# Patient Record
Sex: FEMALE | Race: WHITE | HISPANIC OR LATINO | Employment: UNEMPLOYED | URBAN - METROPOLITAN AREA
[De-identification: names, ages, dates, MRNs, and addresses within clinical notes are randomized per-mention and may not be internally consistent; named-entity substitution may affect disease eponyms.]

---

## 2020-05-14 ENCOUNTER — INITIAL PRENATAL (OUTPATIENT)
Dept: OBGYN | Facility: CLINIC | Age: 25
End: 2020-05-14

## 2020-05-14 VITALS
HEIGHT: 62 IN | WEIGHT: 186.8 LBS | DIASTOLIC BLOOD PRESSURE: 68 MMHG | BODY MASS INDEX: 34.37 KG/M2 | SYSTOLIC BLOOD PRESSURE: 104 MMHG

## 2020-05-14 DIAGNOSIS — N93.8 DUB (DYSFUNCTIONAL UTERINE BLEEDING): ICD-10-CM

## 2020-05-14 DIAGNOSIS — G43.809 OTHER MIGRAINE WITHOUT STATUS MIGRAINOSUS, NOT INTRACTABLE: ICD-10-CM

## 2020-05-14 PROBLEM — G43.909 MIGRAINES: Status: ACTIVE | Noted: 2020-05-14

## 2020-05-14 LAB
INT CON NEG: NEGATIVE
INT CON POS: POSITIVE
POC URINE PREGNANCY TEST: POSITIVE

## 2020-05-14 PROCEDURE — 81025 URINE PREGNANCY TEST: CPT | Performed by: NURSE PRACTITIONER

## 2020-05-14 PROCEDURE — 99203 OFFICE O/P NEW LOW 30 MIN: CPT | Performed by: NURSE PRACTITIONER

## 2020-05-14 SDOH — HEALTH STABILITY: MENTAL HEALTH: HOW OFTEN DO YOU HAVE A DRINK CONTAINING ALCOHOL?: NEVER

## 2020-05-14 NOTE — PROGRESS NOTES
HPI:  Yamilex Ribera is a 25 y.o. y.o. female who presents for problem gyn visit: abnormal uterine bleeding secondary to a positive pregnancy . Pt reports migraines chronically that get bad in pregnancy. Denies any cramping, bleeding, leaking fluid. Patient's last menstrual period was 2020 (exact date).    NIRAV: 21. EGA 5 weeks 5 days. Reports no fetal movement. Reports no ED visits or US.     Review of Systems :  Constitutional: Denies any fevers, chills, weight loss.   EENT: Denies any hearing loss or visual changes.   Cardio: Denies any chest pain, pressure, palpitations, shortness of breath, swelling.   Resp: Denies any cough, trouble breathing, wheezing.   GI: Denies any abdominal pain, heartburn, bloody stools.   : Denies any urinary urgency, frequency, pain.   Pertinent positives documented in HPI and all other systems reviewed & are negative    All PMH, PSH, allergies, social history and FH reviewed and updated today:  Reports hx of migraines.   Reports c/s 2018 for fetal tachy at 8cm.   OB History    Para Term  AB Living   3 2 1     2   SAB TAB Ectopic Molar Multiple Live Births             2      # Outcome Date GA Lbr Jose/2nd Weight Sex Delivery Anes PTL Lv   3 Current            2 Para 18 39w0d  3.714 kg (8 lb 3 oz) M CS-Unspec   KECIA      Birth Comments: Pt states having issues with her placenta, baby was also having respiration problems   1 Term 11/09/15 39w0d  3.204 kg (7 lb 1 oz) F Vag-Spont EPI  KECIA     Denies any alcohol, drug and tobacco use. Report this is a unplanned pregnancy but desired. Reports one current male sexual partner who is also FOB.   Reports hx of chlamydia treated in . Denies any other hx of STIs. Reports last pap as not done.   Denies any genetic issues on patient's side of family and FOB side of family.   Reports taking prenatal vitamins, no other medications.  Denies any allergies to medication, food and environment.        Objective:  "  Vital measurements:  /68   Ht 1.575 m (5' 2\")   Wt 84.7 kg (186 lb 12.8 oz)   Body mass index is 34.17 kg/m². (Goal BM I>18 <25)    Physical Exam   Nursing note and vitals reviewed.    -Constitutional: She is oriented to person, place, and time. She appears well-developed and well-nourished. No distress.     -Skin: Skin is warm and dry. No rash noted. She is not diaphoretic. No erythema. No pallor.     -Psychiatric: She has a normal mood and affect. Her behavior is normal. Judgment and thought content normal.     -Heart auscultated and regular rate and rhythm.     -Lungs are clear to auscultation bilaterally.     -Thyroid is palpated as non tender without masses.     -Abdomin is soft on palpation. She exhibits no distension and no mass. No tenderness. She has no rebound and no guarding.     Genitourinary:    Pelvic exam will be deferred until new ob visit for insurance purposes.        Assessment:     1. DUB (dysfunctional uterine bleeding)  POCT Pregnancy         Plan:   Gyn exam deferred due to insurance   Partial physical exam performed  Pt to start/continue prenatal vitamins  Reviewed remedies for  migraines  New OB appt at 10-12 weeks     Return in about 5 weeks (around 6/18/2020).      "

## 2020-05-14 NOTE — PROGRESS NOTES
today  Pregnancy test in office today was positive   LMP 4/4/2020  PNV  Phone #[phone]  Pharmacy verified with patient  PT states having some cramping. She also states having migraines for the past 4 days

## 2020-05-21 ENCOUNTER — APPOINTMENT (OUTPATIENT)
Dept: OBGYN | Facility: CLINIC | Age: 25
End: 2020-05-21

## 2020-06-30 ENCOUNTER — INITIAL PRENATAL (OUTPATIENT)
Dept: OBGYN | Facility: CLINIC | Age: 25
End: 2020-06-30

## 2020-06-30 ENCOUNTER — HOSPITAL ENCOUNTER (OUTPATIENT)
Facility: MEDICAL CENTER | Age: 25
End: 2020-06-30
Attending: NURSE PRACTITIONER
Payer: MEDICAID

## 2020-06-30 DIAGNOSIS — O34.219 HX OF CESAREAN SECTION COMPLICATING PREGNANCY: ICD-10-CM

## 2020-06-30 DIAGNOSIS — O09.92 ENCOUNTER FOR SUPERVISION OF HIGH RISK PREGNANCY IN SECOND TRIMESTER, ANTEPARTUM: Primary | ICD-10-CM

## 2020-06-30 PROCEDURE — 0500F INITIAL PRENATAL CARE VISIT: CPT | Performed by: NURSE PRACTITIONER

## 2020-06-30 PROCEDURE — 87491 CHLMYD TRACH DNA AMP PROBE: CPT

## 2020-06-30 PROCEDURE — 87591 N.GONORRHOEAE DNA AMP PROB: CPT

## 2020-06-30 PROCEDURE — 87624 HPV HI-RISK TYP POOLED RSLT: CPT

## 2020-06-30 PROCEDURE — 88175 CYTOPATH C/V AUTO FLUID REDO: CPT

## 2020-06-30 PROCEDURE — 8198 PREG CTR PKG RATE (SYSTEM): Performed by: NURSE PRACTITIONER

## 2020-06-30 ASSESSMENT — ENCOUNTER SYMPTOMS
RESPIRATORY NEGATIVE: 1
NEUROLOGICAL NEGATIVE: 1
GASTROINTESTINAL NEGATIVE: 1
CARDIOVASCULAR NEGATIVE: 1
PSYCHIATRIC NEGATIVE: 1
EYES NEGATIVE: 1
MUSCULOSKELETAL NEGATIVE: 1
CONSTITUTIONAL NEGATIVE: 1

## 2020-06-30 ASSESSMENT — EDINBURGH POSTNATAL DEPRESSION SCALE (EPDS)
I HAVE BEEN SO UNHAPPY THAT I HAVE BEEN CRYING: ONLY OCCASIONALLY
I HAVE LOOKED FORWARD WITH ENJOYMENT TO THINGS: RATHER LESS THAN I USED TO
I HAVE BEEN SO UNHAPPY THAT I HAVE HAD DIFFICULTY SLEEPING: NOT AT ALL
I HAVE BEEN ABLE TO LAUGH AND SEE THE FUNNY SIDE OF THINGS: AS MUCH AS I ALWAYS COULD
THINGS HAVE BEEN GETTING ON TOP OF ME: YES, SOMETIMES I HAVEN'T BEEN COPING AS WELL AS USUAL
THE THOUGHT OF HARMING MYSELF HAS OCCURRED TO ME: NEVER
I HAVE FELT SCARED OR PANICKY FOR NO GOOD REASON: NO, NOT MUCH
I HAVE FELT SAD OR MISERABLE: NOT VERY OFTEN
I HAVE BEEN ANXIOUS OR WORRIED FOR NO GOOD REASON: YES, VERY OFTEN
I HAVE BLAMED MYSELF UNNECESSARILY WHEN THINGS WENT WRONG: YES, SOME OF THE TIME
TOTAL SCORE: 11

## 2020-06-30 NOTE — PROGRESS NOTES
Subjective:      S:  Yamilex Ribera is a 25 y.o.  female  @ She is 12w3d with an NIRAV of Estimated Date of Delivery: 21 based off of LMP who presents for her new OB exam.      Her OB hx includes 1  and 1 C/S due to TROY.   History of HSV I or II in self or partner: no  History of STIs in self or partner: no  History of Thyroid problems: no    No ER visits or previous care in this pregnancy. She has no complaints.  Desires AFP when appropriate.  Declines CF.  Reports no FM, VB, LOF, or cramping.  Denies dysuria, vaginal DC.  Pt is  and lives with FOB and children. FOB is happy and supportive. 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  Pregnancy is unplanned.    O:  There were no vitals filed for this visit. See H&P Prenatal Physical.          Wet mount: not indicated        FHTs: 160        Fundal ht: 12     A:   1.  IUP @ 12w3d NIRAV: Estimated Date of Delivery: 21 per LMP         2.  S=D        3.    Patient Active Problem List    Diagnosis Date Noted   • Encounter for supervision of high risk pregnancy in second trimester, antepartum 2020   • Hx of  section x 1 undecided about repeat or TOLAC 2020   • Migraines 2020         P:  1.  GC/CT done. Pap done.         2.  Prenatal labs and UDS ordered - lab slip given        3.  Discussed diet and exercise during pregnancy. Encouraged good nutrition, and daily exercise including walking or swimming. Discussed expected weight gain during pregnancy.              4.  Discussed adequate hydration during pregnancy.        5.  NOB packet given        6.  Return to office in 4 wks with physician to discuss R C/S vs TOLAC        7.  Complete OB US in 7-8 wks        8.  Pregnancy guide provided        9.  Childbirth education discussed. Not a candidate for Centering Pregnancy         HPI    Review of Systems   Constitutional: Negative.    HENT: Negative.    Eyes: Negative.     Respiratory: Negative.    Cardiovascular: Negative.    Gastrointestinal: Negative.    Genitourinary: Negative.    Musculoskeletal: Negative.    Skin: Negative.    Neurological: Negative.    Endo/Heme/Allergies: Negative.    Psychiatric/Behavioral: Negative.           Objective:     LMP 2020 (Exact Date)      Physical Exam  Vitals signs and nursing note reviewed.   Constitutional:       Appearance: She is well-developed.   Neck:      Musculoskeletal: Normal range of motion and neck supple.   Cardiovascular:      Rate and Rhythm: Normal rate and regular rhythm.      Heart sounds: Normal heart sounds.   Pulmonary:      Effort: Pulmonary effort is normal.      Breath sounds: Normal breath sounds.   Abdominal:      Palpations: Abdomen is soft.   Genitourinary:     Vagina: Normal.      Comments: Uterus enlarged, c/w 12 wk ga  Musculoskeletal: Normal range of motion.   Skin:     General: Skin is warm and dry.   Neurological:      Mental Status: She is alert and oriented to person, place, and time.      Deep Tendon Reflexes: Reflexes are normal and symmetric.   Psychiatric:         Behavior: Behavior normal.         Thought Content: Thought content normal.         Judgment: Judgment normal.                 Assessment/Plan:       1. Encounter for supervision of high risk pregnancy in second trimester, antepartum    - HEP C VIRUS ANTIBODY; Future  - PREG CNTR PRENATAL PN; Future  - THINPREP RFLX HPV ASCUS W/CTNG; Future  - URINE DRUG SCREEN W/CONF (AR); Future  - US-OB 2ND 3RD TRI COMPLETE; Future  - TSH; Future  - FREE THYROXINE; Future    2. Hx of  section complicating pregnancy

## 2020-06-30 NOTE — PROGRESS NOTES
NOB today  LMP: 04/04/2020  Last pap:3/27/2017  Phone # [phone]  Pharmacy confirmed  On PNV  Cystic Fibrosis test offered.  C/o  Cramping

## 2020-07-01 DIAGNOSIS — O09.92 ENCOUNTER FOR SUPERVISION OF HIGH RISK PREGNANCY IN SECOND TRIMESTER, ANTEPARTUM: ICD-10-CM

## 2020-07-03 LAB
C TRACH DNA GENITAL QL NAA+PROBE: NEGATIVE
CYTOLOGY REG CYTOL: NORMAL
N GONORRHOEA DNA GENITAL QL NAA+PROBE: NEGATIVE
SPECIMEN SOURCE: NORMAL

## 2020-07-04 LAB
HPV HR 12 DNA CVX QL NAA+PROBE: NEGATIVE
HPV16 DNA SPEC QL NAA+PROBE: NEGATIVE
HPV18 DNA SPEC QL NAA+PROBE: NEGATIVE
SPECIMEN SOURCE: NORMAL

## 2020-07-06 PROBLEM — R87.610 ASCUS OF CERVIX WITH NEGATIVE HIGH RISK HPV: Status: ACTIVE | Noted: 2020-07-06

## 2020-08-10 ENCOUNTER — ROUTINE PRENATAL (OUTPATIENT)
Dept: OBGYN | Facility: CLINIC | Age: 25
End: 2020-08-10

## 2020-08-10 VITALS — WEIGHT: 184 LBS | SYSTOLIC BLOOD PRESSURE: 110 MMHG | DIASTOLIC BLOOD PRESSURE: 60 MMHG | BODY MASS INDEX: 33.65 KG/M2

## 2020-08-10 DIAGNOSIS — O34.219 HX OF CESAREAN SECTION COMPLICATING PREGNANCY: ICD-10-CM

## 2020-08-10 PROCEDURE — 90040 PR PRENATAL FOLLOW UP: CPT | Performed by: OBSTETRICS & GYNECOLOGY

## 2020-08-10 NOTE — PROGRESS NOTES
"Pt here today for OB follow up  Denies FM  WT: 184 lb  BP: 110/60  Preferred pharmacy verified with pt.  Pt states this morning she noticed she had \"green\" bowel movements and urinated green . States she has a \"pinch\" on her left upper belly.    Desires AFP  US on 08/24/2020  PNP, UDS labs not done yet. Pt states she will get them done this week.   Good # [phone]    "

## 2020-08-10 NOTE — PROGRESS NOTES
"S: Pt presents for routine OB follow up.  No contractions, vaginal bleeding, or leaking fluids. Good fetal movement.  Patient here for TOLAC discussion.   Patient states that when she woke up this morning her bowel movement was greenish.  It was a normal bowel movement without diarrhea however patient states she has been constipated recently.    O: /60   Wt 83.5 kg (184 lb)   LMP 2020 (Exact Date)   BMI 33.65 kg/m²   Vitals:    08/10/20 1054   BP: 110/60   Weight: 83.5 kg (184 lb)     Vitals, fundal height , fetal position, and FHR reviewed on flowsheet    Patient Active Problem List   Diagnosis   • Migraines   • Encounter for supervision of high risk pregnancy in second trimester, antepartum   • Hx of  section x 1 undecided about repeat or TOLAC   • ASCUS of cervix with negative high risk HPV       A/P:  25 y.o.  at 18w2d presents for routine obstetric follow-up.     #Prenatal care  - Continue prenatal vitamins.  GCCT neg, pap: ASCUS w negative HPV-> normal testing  TOLAC counseling performed at 18wks. [ ] undecided on TOLAC vs repeat with BTL. Needs another MD visit at 28 wks for final decision  -TOLAC Counseling Note  Summary of prior delivery history: vaginal delivery then primary  section      Prior  History:    Gestation Age: 39    Indication for : fetal distress.  Patient states that she was told her baby was tachycardic and that if she did not have a  her baby \" would not make it\"    Labor:  Yes, patient was having contractions and was sick centimeters dilated at the time    What hospital/state/country was  done: [facility]    Any post-operative counseling regarding mode of delivery for next pregnancy:      Operative Note reviewed:  Records request pending       Patient was counseled regarding the benefits and risks of trial of labor after a prior  versus a repeat . These general risks and benefits are included in the " institutional consent that the patient signed at the clinic. Basically the patient was told that a successful vaginal delivery is the safest mode for mother and baby. A repeat  section prior to labor or in early labor is almost as safe for the mother as a vaginal delivery and minimizes the risk of uterine rupture but does carry a slight increase risk of excessive bleeding, infection or injury to adjacent organs. The mode of delivery with the greatest risk of complications is a  in active labor. A successful vaginal delivery has less risk of major hemorrhage and infection and typically a shorter hospital stay, however there is the risk of uterine rupture. A repeat  also increases the risk of subsequent pregnancies being delivered by .       In particular the patient was told that with spontaneous labor her chances of a successful trial of labor was 72.9% according to the MFMU calculator online and her risk of uterine rupture was <1%. These percentages were influenced by her history of ethnicity, BMI, reason for CS, delivery history, and type of uterine scar.      She knows that she may change her mind at a later date. She has also been told that she may be advised by the medical staff later in pregnancy or during labor that she should consider a different mode of delivery given new developments or complications in her pregnancy.     Based on this counseling the patient indicates at this time that she is undecided.  She states she is thinking about getting a tubal ligation but as she is young, she does not know if this is an appropriate time to do it however she does have 2 other children at home.  She states that if she was to have a tubal ligation, she would want a repeat  section       Counseling time was 30 minutes to discuss TOLAC.    -> Patient needs another MD visit at 28 weeks to discuss her final decision on mode of delivery  - Follow-up: 2wks

## 2020-08-24 ENCOUNTER — APPOINTMENT (OUTPATIENT)
Dept: RADIOLOGY | Facility: IMAGING CENTER | Age: 25
End: 2020-08-24
Attending: NURSE PRACTITIONER

## 2020-08-24 DIAGNOSIS — O09.92 ENCOUNTER FOR SUPERVISION OF HIGH RISK PREGNANCY IN SECOND TRIMESTER, ANTEPARTUM: ICD-10-CM

## 2020-08-24 PROCEDURE — 76805 OB US >/= 14 WKS SNGL FETUS: CPT | Mod: TC | Performed by: NURSE PRACTITIONER

## 2020-09-16 ENCOUNTER — ROUTINE PRENATAL (OUTPATIENT)
Dept: OBGYN | Facility: CLINIC | Age: 25
End: 2020-09-16

## 2020-09-16 VITALS — BODY MASS INDEX: 33.84 KG/M2 | SYSTOLIC BLOOD PRESSURE: 122 MMHG | DIASTOLIC BLOOD PRESSURE: 68 MMHG | WEIGHT: 185 LBS

## 2020-09-16 DIAGNOSIS — O09.92 ENCOUNTER FOR SUPERVISION OF HIGH RISK PREGNANCY IN SECOND TRIMESTER, ANTEPARTUM: Primary | ICD-10-CM

## 2020-09-16 PROCEDURE — 90040 PR PRENATAL FOLLOW UP: CPT | Performed by: NURSE PRACTITIONER

## 2020-09-16 NOTE — PROGRESS NOTES
Pt here today for OB follow up  Reports +FM  WT: 127 lb  BP: 122/68  Preferred pharmacy verified with pt.  Pt states last night she was having strong upper stomach pain that lasted for 4 hours, states today she is till having the discomfort. States no other complaints.   Pt states she was not able to get the AFP done after her last appt but still interested to get it done. Okayed per provider to get it done ASAP. Pt informed is a time sensitive test. Lab slip reprinted for pt.  1 hr gtt, CBC, and T.Pallidum labs ordered for pt. Instructions given today.  Good # [phone]

## 2020-09-16 NOTE — PROGRESS NOTES
S:  Pt is  at 23w4d here for routine OB follow up.  Reports an occ cramp.  Also reports some epigastric pain.  Reports good FM.  Denies VB, LOF, RUCs, or vaginal DC.  Denies cough, SOB, sore throat or fever.  Denies exposure to anyone with COVID 19.  Pt reports she wants to attempt a TOLAC.     O:    Vitals:    20 1617   BP: 122/68   Weight: 83.9 kg (185 lb)           FHTs: 158        Fundal ht: 23 cm.        AFP: not done yet.    Complete OB US  2020 2:36 PM     HISTORY/REASON FOR EXAM:  Evaluate fetal anatomy     TECHNIQUE/EXAM DESCRIPTION: OB complete ultrasound.     COMPARISON:  None     FINDINGS:  Fetal Lie:  Transverse  LMP:  2020  Clinical NIRAV by LMP:  2021     Placenta (Location):  Posterior  Placenta Previa: No  Placental Grade: I     Amniotic Fluid Volume:  JACQUELYN = 17.68 cm     Fetal Heart Rate:  146 bpm     Cervical Length:  4.85 cm transabdominal     No maternal adnexal mass is identified.     Umbilical Artery S/D Ratio(s):  Not applicable     Fetal Anatomy  (Seen or Not Seen)  Lateral Ventricles     Seen  Cisterna Magna        Seen  Cerebellum              Seen  CSP             Seen  Orbits             Seen  Face/Lips                Seen  Cord Insertion         Seen  Placental CI         Seen  4 Chamber Heart     Seen  LVOT               Seen  RVOT              Seen  3 Vessel View     Seen  Stomach       Seen  Kidneys                   Seen  Urinary Bladder      Seen  Spine                       Seen  3 Vessel Cord          Seen  Both Upper Extremities    Seen  Both Lower Extremities    Seen  Diaphragm             Seen  Movement       Seen  Gender:  Likely female     Fetal Biometry  BPD    4.47 cm, 19 weeks, 4 days  HC    17.06 cm, 19 weeks, 5 days  AC    14.60 cm, 19 weeks, 6 days  Femur Length    3.23 cm, 20 weeks  Humerus Length    3.32 cm, 21 weeks, 2 days  Cerebellum Diameter   1.85 cm     EGA by this US:  19 weeks, 5 days  NIRAV by this US: 2021  NIRAV by [facility] US:  Not  applicable     Estimated Fetal Weight:  322 grams  EFW Percentile: 27%     Comments:     IMPRESSION:     1.  Single intrauterine pregnancy of an estimated gestational age of 19 weeks, 5 days with an estimated date of delivery of 2021.     2.  No anatomic abnormality identified    A:  IUP 23w4d  Patient Active Problem List    Diagnosis Date Noted   • ASCUS of cervix with negative high risk HPV 2020   • Encounter for supervision of high risk pregnancy in second trimester, antepartum 2020   • Hx of  section x 1 undecided about repeat or TOLAC 2020   • Migraines 2020        P:  1.  Reviewed ultrasound w pt.          2.  Questions answered.          3.  Encouraged adequate water intake        4.  F/u 4 wks.        5.  D/w pt helps for GERD.  Handout given.        6.  28wk labs ordered. Reprinted PNP labs.

## 2020-09-24 ENCOUNTER — TELEPHONE (OUTPATIENT)
Dept: OBGYN | Facility: CLINIC | Age: 25
End: 2020-09-24

## 2020-09-24 NOTE — TELEPHONE ENCOUNTER
Pt LM on VM stating she was told she may have Acid Reflux at her last appointment and was given OTC remedy list. Pt wants to know if she can use off brand Maalox from [facility]. Spoke with pt and informed her she can use it, it is safe in pregnancy. Pt also report some upper epigastric pain, adv pt to keep log and she can discuss further with provider. Pt understood and agreed to plan

## 2020-09-27 ENCOUNTER — HOSPITAL ENCOUNTER (OUTPATIENT)
Facility: MEDICAL CENTER | Age: 25
End: 2020-09-27
Attending: OBSTETRICS & GYNECOLOGY | Admitting: OBSTETRICS & GYNECOLOGY

## 2020-09-27 ENCOUNTER — APPOINTMENT (OUTPATIENT)
Dept: RADIOLOGY | Facility: MEDICAL CENTER | Age: 25
End: 2020-09-27
Attending: NURSE PRACTITIONER
Payer: MEDICAID

## 2020-09-27 VITALS
TEMPERATURE: 97.8 F | WEIGHT: 185 LBS | BODY MASS INDEX: 34.04 KG/M2 | HEART RATE: 104 BPM | RESPIRATION RATE: 18 BRPM | DIASTOLIC BLOOD PRESSURE: 70 MMHG | HEIGHT: 62 IN | OXYGEN SATURATION: 98 % | SYSTOLIC BLOOD PRESSURE: 109 MMHG

## 2020-09-27 PROBLEM — K30 ACID INDIGESTION: Status: ACTIVE | Noted: 2020-09-27

## 2020-09-27 LAB
ALBUMIN SERPL BCP-MCNC: 3.5 G/DL (ref 3.2–4.9)
ALBUMIN/GLOB SERPL: 1.1 G/DL
ALP SERPL-CCNC: 93 U/L (ref 30–99)
ALT SERPL-CCNC: 13 U/L (ref 2–50)
AMYLASE SERPL-CCNC: 55 U/L (ref 20–103)
ANION GAP SERPL CALC-SCNC: 14 MMOL/L (ref 7–16)
APPEARANCE UR: CLEAR
AST SERPL-CCNC: 26 U/L (ref 12–45)
BASOPHILS # BLD AUTO: 0.2 % (ref 0–1.8)
BASOPHILS # BLD: 0.03 K/UL (ref 0–0.12)
BILIRUB SERPL-MCNC: 0.2 MG/DL (ref 0.1–1.5)
BUN SERPL-MCNC: 9 MG/DL (ref 8–22)
CALCIUM SERPL-MCNC: 8.6 MG/DL (ref 8.5–10.5)
CHLORIDE SERPL-SCNC: 100 MMOL/L (ref 96–112)
CO2 SERPL-SCNC: 20 MMOL/L (ref 20–33)
COLOR UR AUTO: YELLOW
CREAT SERPL-MCNC: 0.55 MG/DL (ref 0.5–1.4)
EOSINOPHIL # BLD AUTO: 0.12 K/UL (ref 0–0.51)
EOSINOPHIL NFR BLD: 0.9 % (ref 0–6.9)
ERYTHROCYTE [DISTWIDTH] IN BLOOD BY AUTOMATED COUNT: 41.1 FL (ref 35.9–50)
GLOBULIN SER CALC-MCNC: 3.2 G/DL (ref 1.9–3.5)
GLUCOSE SERPL-MCNC: 89 MG/DL (ref 65–99)
GLUCOSE UR QL STRIP.AUTO: NEGATIVE MG/DL
HCT VFR BLD AUTO: 38 % (ref 37–47)
HGB BLD-MCNC: 12.7 G/DL (ref 12–16)
IMM GRANULOCYTES # BLD AUTO: 0.07 K/UL (ref 0–0.11)
IMM GRANULOCYTES NFR BLD AUTO: 0.5 % (ref 0–0.9)
KETONES UR QL STRIP.AUTO: NEGATIVE MG/DL
LEUKOCYTE ESTERASE UR QL STRIP.AUTO: NEGATIVE
LIPASE SERPL-CCNC: 31 U/L (ref 11–82)
LYMPHOCYTES # BLD AUTO: 1.79 K/UL (ref 1–4.8)
LYMPHOCYTES NFR BLD: 13.7 % (ref 22–41)
MCH RBC QN AUTO: 30.2 PG (ref 27–33)
MCHC RBC AUTO-ENTMCNC: 33.4 G/DL (ref 33.6–35)
MCV RBC AUTO: 90.5 FL (ref 81.4–97.8)
MONOCYTES # BLD AUTO: 0.48 K/UL (ref 0–0.85)
MONOCYTES NFR BLD AUTO: 3.7 % (ref 0–13.4)
NEUTROPHILS # BLD AUTO: 10.53 K/UL (ref 2–7.15)
NEUTROPHILS NFR BLD: 81 % (ref 44–72)
NITRITE UR QL STRIP.AUTO: NEGATIVE
NRBC # BLD AUTO: 0 K/UL
NRBC BLD-RTO: 0 /100 WBC
PH UR STRIP.AUTO: 6 [PH] (ref 5–8)
PLATELET # BLD AUTO: 209 K/UL (ref 164–446)
PMV BLD AUTO: 10.9 FL (ref 9–12.9)
POTASSIUM SERPL-SCNC: 3.7 MMOL/L (ref 3.6–5.5)
PROT SERPL-MCNC: 6.7 G/DL (ref 6–8.2)
PROT UR QL STRIP: NEGATIVE MG/DL
RBC # BLD AUTO: 4.2 M/UL (ref 4.2–5.4)
RBC UR QL AUTO: NEGATIVE
SODIUM SERPL-SCNC: 134 MMOL/L (ref 135–145)
SP GR UR STRIP.AUTO: 1.02 (ref 1–1.03)
WBC # BLD AUTO: 13 K/UL (ref 4.8–10.8)

## 2020-09-27 PROCEDURE — 85025 COMPLETE CBC W/AUTO DIFF WBC: CPT

## 2020-09-27 PROCEDURE — A9270 NON-COVERED ITEM OR SERVICE: HCPCS | Performed by: NURSE PRACTITIONER

## 2020-09-27 PROCEDURE — 76700 US EXAM ABDOM COMPLETE: CPT

## 2020-09-27 PROCEDURE — 700111 HCHG RX REV CODE 636 W/ 250 OVERRIDE (IP): Performed by: NURSE PRACTITIONER

## 2020-09-27 PROCEDURE — 82150 ASSAY OF AMYLASE: CPT

## 2020-09-27 PROCEDURE — 700102 HCHG RX REV CODE 250 W/ 637 OVERRIDE(OP): Performed by: NURSE PRACTITIONER

## 2020-09-27 PROCEDURE — 83690 ASSAY OF LIPASE: CPT

## 2020-09-27 PROCEDURE — 81002 URINALYSIS NONAUTO W/O SCOPE: CPT

## 2020-09-27 PROCEDURE — 99213 OFFICE O/P EST LOW 20 MIN: CPT | Performed by: NURSE PRACTITIONER

## 2020-09-27 PROCEDURE — 96372 THER/PROPH/DIAG INJ SC/IM: CPT

## 2020-09-27 PROCEDURE — 36415 COLL VENOUS BLD VENIPUNCTURE: CPT

## 2020-09-27 PROCEDURE — 80053 COMPREHEN METABOLIC PANEL: CPT

## 2020-09-27 RX ORDER — MORPHINE SULFATE 4 MG/ML
5 INJECTION, SOLUTION INTRAMUSCULAR; INTRAVENOUS ONCE
Status: COMPLETED | OUTPATIENT
Start: 2020-09-27 | End: 2020-09-27

## 2020-09-27 RX ORDER — ONDANSETRON 2 MG/ML
4 INJECTION INTRAMUSCULAR; INTRAVENOUS ONCE
Status: DISCONTINUED | OUTPATIENT
Start: 2020-09-27 | End: 2020-09-27

## 2020-09-27 RX ORDER — FAMOTIDINE 20 MG/1
20 TABLET, FILM COATED ORAL 2 TIMES DAILY
Qty: 60 TAB | Refills: 1 | Status: SHIPPED | OUTPATIENT
Start: 2020-09-27 | End: 2020-10-26 | Stop reason: SDUPTHER

## 2020-09-27 RX ADMIN — LIDOCAINE HYDROCHLORIDE 15 ML: 20 SOLUTION OROPHARYNGEAL at 03:40

## 2020-09-27 RX ADMIN — MORPHINE SULFATE 5 MG: 4 INJECTION INTRAVENOUS at 03:35

## 2020-09-27 ASSESSMENT — PAIN SCALES - GENERAL: PAINLEVEL: 8

## 2020-09-27 NOTE — H&P
History and Physical      Yamilex Ribera is a 25 y.o. female  at 25w1d with an adela of 21who presents via EMS for upper right epigastric pain radiating to back. Onset x a few weeks. Sometimes relieved by vomiting. Sometimes relieved by tums otc. Sx are worse when lying down. Feels like she cannot eat after 1700 noc. States has some loose stool, greenish in color. Denies cramping, bleeding or leaking of fluid. Denies dysuria. No fever, chills, HA, blurred vision. Endorses good fetal movement.     Subjective:   negative  For CTXS.   positive Feels pain   negative for LOF  negative for vaginal bleeding.   positive for fetal movement    ROS: Pertinent items are noted in HPI.    Past Medical History:   Diagnosis Date   • Migraines 2020     Past Surgical History:   Procedure Laterality Date   • PRIMARY C SECTION       OB History    Para Term  AB Living   3 2 2     2   SAB TAB Ectopic Molar Multiple Live Births             2      # Outcome Date GA Lbr Jose/2nd Weight Sex Delivery Anes PTL Lv   3 Current            2 Term 18 39w0d  3.714 kg (8 lb 3 oz) M CS-Unspec   [facility]      Birth Comments: Pt states having issues with her placenta, baby was also having respiration problems   1 Term 11/09/15 39w0d  3.204 kg (7 lb 1 oz) F Vag-Spont EPI  K[facility]IA     Social History     Socioeconomic History   • Marital status:      Spouse name: Not on file   • Number of children: Not on file   • Years of education: Not on file   • Highest education level: Not on file   Occupational History   • Not on file   Social Needs   • Financial resource strain: Not on file   • Food insecurity     Worry: Not on file     Inability: Not on file   • Transportation needs     Medical: Not on file     Non-medical: Not on file   Tobacco Use   • Smoking status: Never Smoker   • Smokeless tobacco: Never Used   Substance and Sexual Activity   • Alcohol use: Never     Frequency: Never   • Drug use: Never   • Sexual  "activity: Yes     Partners: Male     Comment: none   Lifestyle   • Physical activity     Days per week: Not on file     Minutes per session: Not on file   • Stress: Not on file   Relationships   • Social connections     Talks on phone: Not on file     Gets together: Not on file     Attends Je[employer]'s witness service: Not on file     Active member of club or organization: Not on file     Attends meetings of clubs or organizations: Not on file     Relationship status: Not on file   • Intimate partner violence     Fear of current or ex partner: Not on file     Emotionally abused: Not on file     Physically abused: Not on file     Forced sexual activity: Not on file   Other Topics Concern   • Not on file   Social History Narrative   • Not on file     Allergies: Patient has no known allergies.    Current Facility-Administered Medications:   •  ondansetron (ZOFRAN) syringe/vial injection 4 mg, 4 mg, Intravenous, Once, GA GandhiP.    Prenatal care with [facility] starting at 5 5/7 week gestation with following problems:  Patient Active Problem List    Diagnosis Date Noted   • Acid indigestion 2020   • ASCUS of cervix with negative high risk HPV 2020   • Encounter for supervision of high risk pregnancy in second trimester, antepartum 2020   • Hx of  section x 1 undecided about repeat or TOLAC 2020   • Migraines 2020         Objective:      /70   Pulse (!) 104   Temp 36.6 °C (97.8 °F) (Temporal)   Resp 18   Ht 1.575 m (5' 2\")   Wt 83.9 kg (185 lb)   SpO2 98%     General:   no acute distress, alert, cooperative   Skin:   normal   HEENT:  PERRLA   Lungs:   CTA bilateral   Heart:   S1, S2 normal   Abdomen:   gravid, right upper quad tenderness to palpation.    EFW:  deferred   Pelvis:  deferred   FHT:  140 BPM   Uterine Size: deferred   Presentations: deferred   Cervix:     Dilation: No exam    Effacement:     Station:      Consistency:     Position:      Lab " Review  Lab:      Recent Results (from the past 5880 hour(s))   POCT Pregnancy    Collection Time: 05/14/20  2:53 PM   Result Value Ref Range    POC Urine Pregnancy Test positive Negative    Internal Control Positive Positive     Internal Control Negative Negative    THINPREP RFLX HPV ASCUS W/CTNG    Collection Time: 06/30/20  4:17 PM   Result Value Ref Range    Cytology Reg See Path Report     C. trachomatis by PCR Negative Negative    N. gonorrhoeae by PCR Negative Negative    Source Endo/Cervical    HPV by PCR Assoc. w/Thinprep    Collection Time: 06/30/20  4:17 PM   Result Value Ref Range    Source Endo/Cervical     HPV Genotype 16 Negative Negative    HPV Genotype 18 Negative Negative    HPV Other High Risk Genotypes Negative Negative   POC UA    Collection Time: 09/27/20  2:26 AM   Result Value Ref Range    POC Color Yellow     POC Appearance Clear     POC Glucose Negative Negative mg/dL    POC Ketones Negative Negative mg/dL    POC Specific Gravity 1.020 1.005 - 1.030    POC Blood Negative Negative    POC Urine PH 6.0 5.0 - 8.0    POC Protein Negative Negative mg/dL    POC Nitrites Negative Negative    POC Leukocyte Esterase Negative Negative   CBC WITH DIFFERENTIAL    Collection Time: 09/27/20  3:23 AM   Result Value Ref Range    WBC 13.0 (H) 4.8 - 10.8 K/uL    RBC 4.20 4.20 - 5.40 M/uL    Hemoglobin 12.7 12.0 - 16.0 g/dL    Hematocrit 38.0 37.0 - 47.0 %    MCV 90.5 81.4 - 97.8 fL    MCH 30.2 27.0 - 33.0 pg    MCHC 33.4 (L) 33.6 - 35.0 g/dL    RDW 41.1 35.9 - 50.0 fL    Platelet Count 209 164 - 446 K/uL    MPV 10.9 9.0 - 12.9 fL    Neutrophils-Polys 81.00 (H) 44.00 - 72.00 %    Lymphocytes 13.70 (L) 22.00 - 41.00 %    Monocytes 3.70 0.00 - 13.40 %    Eosinophils 0.90 0.00 - 6.90 %    Basophils 0.20 0.00 - 1.80 %    Immature Granulocytes 0.50 0.00 - 0.90 %    Nucleated RBC 0.00 /100 WBC    Neutrophils (Absolute) 10.53 (H) 2.00 - 7.15 K/uL    Lymphs (Absolute) 1.79 1.00 - 4.80 K/uL    Monos (Absolute) 0.48  0.00 - 0.85 K/uL    Eos (Absolute) 0.12 0.00 - 0.51 K/uL    Baso (Absolute) 0.03 0.00 - 0.12 K/uL    Immature Granulocytes (abs) 0.07 0.00 - 0.11 K/uL    NRBC (Absolute) 0.00 K/uL   Comp Metabolic Panel    Collection Time: 09/27/20  3:23 AM   Result Value Ref Range    Sodium 134 (L) 135 - 145 mmol/L    Potassium 3.7 3.6 - 5.5 mmol/L    Chloride 100 96 - 112 mmol/L    Co2 20 20 - 33 mmol/L    Anion Gap 14.0 7.0 - 16.0    Glucose 89 65 - 99 mg/dL    Bun 9 8 - 22 mg/dL    Creatinine 0.55 0.50 - 1.40 mg/dL    Calcium 8.6 8.5 - 10.5 mg/dL    AST(SGOT) 26 12 - 45 U/L    ALT(SGPT) 13 2 - 50 U/L    Alkaline Phosphatase 93 30 - 99 U/L    Total Bilirubin 0.2 0.1 - 1.5 mg/dL    Albumin 3.5 3.2 - 4.9 g/dL    Total Protein 6.7 6.0 - 8.2 g/dL    Globulin 3.2 1.9 - 3.5 g/dL    A-G Ratio 1.1 g/dL   AMYLASE    Collection Time: 09/27/20  3:23 AM   Result Value Ref Range    Amylase 55 20 - 103 U/L   LIPASE    Collection Time: 09/27/20  3:23 AM   Result Value Ref Range    Lipase 31 11 - 82 U/L   ESTIMATED GFR    Collection Time: 09/27/20  3:23 AM   Result Value Ref Range    GFR If African American >60 >60 mL/min/1.73 m 2    GFR If Non African American >60 >60 mL/min/1.73 m 2   US-ABDOMEN COMPLETE SURVEY  Order: [id number]  Status:  Final result   Visible to patient:  No (not released) Next appt:  10/14/2020 at 04:30 PM in OB/Gyn (Deepthi Gomez, C.N.M.)  Details    Reading Physician Reading Date Result Priority   Carlos Eduardo Burch M.D.  [phone] 9/27/2020 STAT      Narrative & Impression        9/27/2020 3:32 AM     HISTORY/REASON FOR EXAM:  Pain; r/o cholecystitis  Pain     TECHNIQUE/EXAM DESCRIPTION AND NUMBER OF VIEWS:  Complete abdomen survey.     COMPARISON: None     FINDINGS:  The liver measures 16.41 cm. The liver is heterogeneous with increased echogenicity. The echogenicity limits evaluation for liver mass. However, there is no evidence of solid mass lesion.     The gallbladder is normal without wall thickening or  calculi.  The gallbladder wall thickness measures 2.90 mm. There is no pericholecystic fluid.  The common duct measures 12.30 mm.     The visualized pancreas is unremarkable.  The visualized aorta is normal in caliber.     Intrahepatic IVC is patent.     The portal vein is patent with hepatopetal flow. The MPV measures 1.3 cm.     The right kidney measures 11.29 cm. The right kidney has normal cortical size and echotexture. The corticomedullary differentiation is preserved. There are no hydronephrosis, no renal calculi or masses.     The left kidney measures 11.33 cm. The left kidney has normal cortical size and echotexture. The corticomedullary differentiation is preserved. There are no hydronephrosis, no renal calculi or masses.     The spleen measures 10.70 cm maximally.     The bladder demonstrates no focal wall abnormality.     There is no ascites     IMPRESSION:        1. Gallbladder sludge. No gallbladder wall thickening or pericholecystic fluid.  2. Dilated CBD, measuring 12 mm. Distal obstruction is possible. Further evaluation with MRCP is recommended.  3. Hepatic steatosis.                   Assessment:   Yamilex Ribera at 25w1d  Labor status: Not evaluated.  Obstetrical history significant for   Patient Active Problem List    Diagnosis Date Noted   • Acid indigestion 2020   • ASCUS of cervix with negative high risk HPV 2020   • Encounter for supervision of high risk pregnancy in second trimester, antepartum 2020   • Hx of  section x 1 undecided about repeat or TOLAC 2020   • Migraines 2020   .      Plan:     CBC, CMP, amylase, lipase ordered and reviewed. Abdominal ultrasound ordered and reviewed. 5 IM morphine given. GI cocktail given. PO hydration. Patient felt better with GI cocktail. We were unable to obtain FFN because patient had recent intercourse. However, no UC's were noted and uterus was palpated soft.  Script for pepcid to take 2 x per day rather  than PRN for a week to hopefully ease sx. Discussed case with Dr. Carrizales. Based on ultrasound review patient may need MRCP. She really desires to return home at this time. If sx worsen she will present again for further eval. If patient presents again consider MRCP then as base workup is already completed.     I discussed the management with the CNM. I reviewed her note and agree with the documented findings and plan of care.    Additional attending comments:  We discussed admitting the patient for MRCP and GI or gen surg eval depending on the findings.  The patient was feeling significantly improved after the GI cocktail and therefore wanted to go home.  Explained the risks of biliary tree obstruction for patient and fetus and patient will return if symptoms return.     Serena Carrizales D.O.

## 2020-09-27 NOTE — PROGRESS NOTES
"0210- pt is a , 25.1 weeks gestation IUP, with c/o MUQ abdominal radiating pain and tenderness. Pt moaning and writhing in wheelchair with heat pack to upper abdomen. No c/o lof, bleeding, uc's or dfm. Pt states \"this has been hurting off and on since last week, I called the office and they said it could be acid reflux and I could take stuff at the store, but the only time it really feels better is after I throw up. But I  havent been able to throw up today. The pain got so bad I couldn't breathe so we called for an ambulance but by the time they got there they said I was fine and we could drive ourselves to [facility]\". efm and toco placed, vss.   0250- Kaylee LIN given report, received orders for labs and GI cocktail. Discussed poc with pt.  0300- Kaylee LIN and JEMAL Kirkland MS at bedside for assessment. Received orders for IM morphine, US, po hydration  0330- US tech at bedside. IM morphine and GI cocktail given (see mar)  0415- pt states she is feeling much better, pain is tolerable  0440- received orders may remove pt from monitors  0515- JEMAL Kirkland MS at bedside discussing poc for possible obs. Pt requesting to go home.  0530- received orders from Kaylee LIN for discharge with pt understanding to take Rx of pepcid accordingly, and return if pain returns. Discussed poc with pt.   0538- addressed discharge and Rx instructions with pt, all questions answered. Left off floor stable with SO at side.   "

## 2020-09-27 NOTE — DISCHARGE INSTRUCTIONS
General Instructions:  · If you think you are in labor, time contractions (lying on your left side) from the beginning of one contraction to the beginning of the next contraction for at least one hour.  · Increase fluid intake: you should consume 10-12 8 oz glasses of non-caffeinated fluid per day.  · Report any pressure or burning on urination to your physician.  · Monitor fetal movement: If you notice an absence or decrease in fetal movement, drink a large glass of water and rest on your side.  If there is no increase in movement, call your physician or go to the hospital for further evaluation.  · Report any sudden, sharp abdominal pain.  · Report any bleeding.  Spotting or pinkish discharge is normal after vaginal exam.  You may also spot after sexual intercourse.    Pre-term Labor (<37 weeks):  Call your physician or return to the hospital if:  · You have painless regular contractions more than 4 in one hour.  · Your water breaks (remember time and color).  · You have menstrual-like cramps, a low dull backache or pressure in your pelvis or back.  · Your baby does not move enough to complete the daily kick count (10 movements in 2 hours).  · Your baby moves much less often than on the days before or you have not felt your baby move all day.  · Please review the MEDICATION LIST section of your AFTER VISIT SUMMARY document.  · Take your medication as prescribed      Other Instructions:  Please carefully review your entire AFTER VISIT SUMMARY document for all discharge instructions.    Indigestion  Indigestion is a feeling of pain, discomfort, burning, or fullness in the upper part of your belly (abdomen). It can come and go. It may occur often or rarely. Indigestion tends to happen while you are eating or right after you have finished eating. Indigestion may be a symptom of another condition. It may be worse:  · At night.  · When bending over.  · While lying down.  Follow these instructions at home:  Eating and  drinking    · Follow an eating plan as told by your doctor.  · You may need to avoid foods and drinks such as:  ? Chocolate and cocoa.  ? Peppermint and mint flavorings.  ? Garlic and onions.  ? Horseradish.  ? Spicy and acidic foods, such as:  § Peppers.  § Chili powder and ramirez powder.  § Vinegar.  § Hot sauces and BBQ sauce.  ? Citrus fruits, such as:  § Oranges.  § Jeffery.  § Limes.  ? Tomato-based foods, such as:  § Red sauce and pizza with red sauce.  § Chili.   § Salsa.  ? Fried and fatty foods, such as:  § Donuts.  § French fries and potato chips.  § High-fat dressings.  ? High-fat meats, such as:  § Hot dogs and sausage.  § Rib eye steak.  § Ham and ohara.  ? High-fat dairy items, such as:  § Whole milk.  § Butter.  § Cream cheese.  ? Coffee and tea (with or without caffeine).  ? Drinks that contain alcohol.  ? Energy drinks and sports drinks.  ? Carbonated drinks or sodas.  ? Citrus fruit juices.  · Eat small meals often. Avoid eating large meals.  · Avoid drinking large amounts of liquid with your meals.  · Avoid eating meals during the 2-3 hours before bedtime.  · Avoid lying down right after you eat.  · Avoid exercise for 2 hours after you eat.  Lifestyle         · Maintain a healthy weight. Ask your doctor what weight is healthy for you. If you need to lose weight, work with your doctor.  · Exercise for at least 30 minutes on 5 or more days each week, or as told by your doctor.  ? Avoid exercises that include bending forward. This can make your symptoms worse.  · Wear loose clothes. Do not wear anything tight around your waist.  · Do not use any products that contain nicotine or tobacco, including cigarettes, e-cigarettes, and chewing tobacco. These can make your symptoms worse. If you need help quitting, ask your doctor.  · Raise (elevate) the head of your bed about 6 inches (15 cm) when you sleep.  · Try to lower your stress. If you need help doing this, ask your doctor.  General  instructions  · Take over-the-counter and prescription medicines only as told by your doctor.  ? Do not take aspirin, ibuprofen, or other NSAIDs unless your doctor says it is okay.  · Pay attention to any changes in your symptoms.  · Keep all follow-up visits as told by your doctor. This is important.  Contact a doctor if:  · You have new symptoms.  · You lose weight and you do not know why it is happening.  · You have trouble swallowing, or it hurts to swallow.  · Your symptoms do not get better with treatment.  · Your symptoms last for more than 2 days.  · You have a fever.  · You throw up (vomit).  Get help right away if:  · You have pain in your arms, neck, jaw, teeth, or back.  · You feel sweaty, dizzy, or light-headed.  · You pass out (faint).  · You have chest pain or shortness of breath.  · You cannot stop throwing up, or you throw up blood.  · Your poop (stool) is bloody or black.  · You have very bad pain in your belly.  These symptoms may represent a serious problem that is an emergency. Do not wait to see if the symptoms will go away. Get medical help right away. Call your local emergency services (911 in the [place] Do not drive yourself to the hospital.  Summary  · Indigestion is a feeling of pain, discomfort, burning, or fullness in the upper part of your belly. It tends to happen while you are eating or right after you have finished eating.  · Follow an eating plan and other lifestyle changes as told by your doctor.  · Take over-the-counter and prescription medicines only as told by your doctor. Do not take aspirin, ibuprofen, or other NSAIDs unless your doctor says it is okay.  · Contact your doctor if your symptoms do not get better or they get worse.  · Some symptoms may represent a serious problem that is an emergency. Do not wait to see if the symptoms will go away. Get medical help right away.  This information is not intended to replace advice given to you by your health care provider. Make  sure you discuss any questions you have with your health care provider.  Document Released: 01/20/2012 Document Revised: 05/20/2019 Document Reviewed: 05/20/2019  Elsevier Patient Education © 2020 [employer]

## 2020-09-30 ENCOUNTER — HOSPITAL ENCOUNTER (OUTPATIENT)
Dept: LAB | Facility: MEDICAL CENTER | Age: 25
End: 2020-09-30
Attending: OBSTETRICS & GYNECOLOGY
Payer: MEDICAID

## 2020-09-30 ENCOUNTER — ROUTINE PRENATAL (OUTPATIENT)
Dept: OBGYN | Facility: CLINIC | Age: 25
End: 2020-09-30

## 2020-09-30 VITALS — BODY MASS INDEX: 33.18 KG/M2 | SYSTOLIC BLOOD PRESSURE: 116 MMHG | WEIGHT: 181.4 LBS | DIASTOLIC BLOOD PRESSURE: 68 MMHG

## 2020-09-30 DIAGNOSIS — O09.92 ENCOUNTER FOR SUPERVISION OF HIGH RISK PREGNANCY IN SECOND TRIMESTER, ANTEPARTUM: Primary | ICD-10-CM

## 2020-09-30 PROCEDURE — 90040 PR PRENATAL FOLLOW UP: CPT | Performed by: NURSE PRACTITIONER

## 2020-09-30 ASSESSMENT — FIBROSIS 4 INDEX: FIB4 SCORE: 0.83

## 2020-09-30 NOTE — PROGRESS NOTES
Pt. Here for OB/FU. Reports Good FM.   Good # [phone]  Pt. States still having abdominal cramping.   Pt was seen at [facility] L&D on 9/27/2020 for abdominal pain.   Pharmacy verified.   Flu vaccine offered and declined.   Pt states tried to do PNP, 1 HR GTT but the lab made in error when she was   Registered but she will go sometime this week.

## 2020-09-30 NOTE — PATIENT INSTRUCTIONS
P:  1. Questions answered.           2. Encouraged adequate water intake        3.   labor precautions reviewed.        4.  F/u 2 wks.        5.  D/w pt her US results, may try a GB diet to see if that helps her symptoms.  Reviewed when to return to hospital.          6.  Encouraged pt to keep appt for labs.

## 2020-09-30 NOTE — PROGRESS NOTES
S:  Pt is  at 25w4d here for routine OB follow up.  Was seen on L&D for abdominal pain.  It has gotten better, though still there a little bit.  Has not yet done her labs.  Has questions about her US results.  Not sure if she will need her GB removed.  Reports good FM.  Denies VB, RUCs, LOF or vaginal DC.  Denies cough, SOB, sore throat or fever.  Denies exposure to anyone with COVID 19.    O:    Vitals:    20 1512   BP: 116/68   Weight: 82.3 kg (181 lb 6.4 oz)           FHTs: 150        Fundal ht: 25 cm.    A:  IUP at 25w4d  Patient Active Problem List    Diagnosis Date Noted   • Acid indigestion 2020   • ASCUS of cervix with negative high risk HPV 2020   • Encounter for supervision of high risk pregnancy in second trimester, antepartum 2020   • Hx of  section x 1 undecided about repeat or TOLAC 2020   • Migraines 2020       P:  1. Questions answered.           2. Encouraged adequate water intake        3.   labor precautions reviewed.        4.  F/u 2 wks.        5.  D/w pt her US results, may try a GB diet to see if that helps her symptoms.  Reviewed when to return to hospital.          6.  Encouraged pt to keep appt for labs.

## 2020-10-07 ENCOUNTER — HOSPITAL ENCOUNTER (OUTPATIENT)
Dept: LAB | Facility: MEDICAL CENTER | Age: 25
End: 2020-10-07
Attending: NURSE PRACTITIONER
Payer: COMMERCIAL

## 2020-10-07 DIAGNOSIS — O09.92 ENCOUNTER FOR SUPERVISION OF HIGH RISK PREGNANCY IN SECOND TRIMESTER, ANTEPARTUM: ICD-10-CM

## 2020-10-07 LAB
ABO GROUP BLD: NORMAL
BACTERIA #/AREA URNS HPF: ABNORMAL /HPF
BLD GP AB SCN SERPL QL: NORMAL
EPI CELLS #/AREA URNS HPF: ABNORMAL /HPF
GLUCOSE 1H P 50 G GLC PO SERPL-MCNC: 89 MG/DL (ref 70–139)
HYALINE CASTS #/AREA URNS LPF: ABNORMAL /LPF
RBC # URNS HPF: ABNORMAL /HPF
RH BLD: NORMAL
WBC #/AREA URNS HPF: ABNORMAL /HPF

## 2020-10-08 LAB
AMPHETAMINES UR QL: NEGATIVE NG/ML
APPEARANCE UR: ABNORMAL
BARBITURATES UR QL: NEGATIVE NG/ML
BASOPHILS # BLD AUTO: 0.3 % (ref 0–1.8)
BASOPHILS # BLD: 0.02 K/UL (ref 0–0.12)
BENZODIAZ UR QL: NEGATIVE NG/ML
BILIRUB UR QL STRIP.AUTO: NEGATIVE
CANNABINOIDS UR QL SCN: NEGATIVE NG/ML
COCAINE UR QL: NEGATIVE NG/ML
COLOR UR: YELLOW
DRUG SCREEN COMMENT UR-IMP: NORMAL
EOSINOPHIL # BLD AUTO: 0.18 K/UL (ref 0–0.51)
EOSINOPHIL NFR BLD: 2.4 % (ref 0–6.9)
ERYTHROCYTE [DISTWIDTH] IN BLOOD BY AUTOMATED COUNT: 45 FL (ref 35.9–50)
GLUCOSE UR STRIP.AUTO-MCNC: NEGATIVE MG/DL
HBV SURFACE AG SER QL: ABNORMAL
HCT VFR BLD AUTO: 43.5 % (ref 37–47)
HCV AB SER QL: NORMAL
HGB BLD-MCNC: 13.8 G/DL (ref 12–16)
HIV 1+2 AB+HIV1 P24 AG SERPL QL IA: NORMAL
IMM GRANULOCYTES # BLD AUTO: 0.02 K/UL (ref 0–0.11)
IMM GRANULOCYTES NFR BLD AUTO: 0.3 % (ref 0–0.9)
KETONES UR STRIP.AUTO-MCNC: ABNORMAL MG/DL
LEUKOCYTE ESTERASE UR QL STRIP.AUTO: ABNORMAL
LYMPHOCYTES # BLD AUTO: 1.73 K/UL (ref 1–4.8)
LYMPHOCYTES NFR BLD: 22.9 % (ref 22–41)
MCH RBC QN AUTO: 29.8 PG (ref 27–33)
MCHC RBC AUTO-ENTMCNC: 31.7 G/DL (ref 33.6–35)
MCV RBC AUTO: 94 FL (ref 81.4–97.8)
MDMA CTO UR SCN-MCNC: NEGATIVE NG/ML
METHADONE UR QL: NEGATIVE NG/ML
MICRO URNS: ABNORMAL
MONOCYTES # BLD AUTO: 0.28 K/UL (ref 0–0.85)
MONOCYTES NFR BLD AUTO: 3.7 % (ref 0–13.4)
NEUTROPHILS # BLD AUTO: 5.32 K/UL (ref 2–7.15)
NEUTROPHILS NFR BLD: 70.4 % (ref 44–72)
NITRITE UR QL STRIP.AUTO: NEGATIVE
NRBC # BLD AUTO: 0 K/UL
NRBC BLD-RTO: 0 /100 WBC
OPIATES UR QL: NEGATIVE NG/ML
OXYCODONE CTO UR SCN-MCNC: NEGATIVE NG/ML
PCP UR QL SCN: NEGATIVE NG/ML
PH UR STRIP.AUTO: 7 [PH] (ref 5–8)
PLATELET # BLD AUTO: 232 K/UL (ref 164–446)
PMV BLD AUTO: 11.8 FL (ref 9–12.9)
PROPOXYPH UR QL: NEGATIVE NG/ML
PROT UR QL STRIP: NEGATIVE MG/DL
RBC # BLD AUTO: 4.63 M/UL (ref 4.2–5.4)
RBC UR QL AUTO: NEGATIVE
RUBV AB SER QL: 125 IU/ML
SP GR UR STRIP.AUTO: 1.02
T4 FREE SERPL-MCNC: 1.17 NG/DL (ref 0.93–1.7)
TREPONEMA PALLIDUM IGG+IGM AB [PRESENCE] IN SERUM OR PLASMA BY IMMUNOASSAY: ABNORMAL
TSH SERPL DL<=0.005 MIU/L-ACNC: 1.47 UIU/ML (ref 0.38–5.33)
UROBILINOGEN UR STRIP.AUTO-MCNC: 0.2 MG/DL
WBC # BLD AUTO: 7.6 K/UL (ref 4.8–10.8)

## 2020-10-26 RX ORDER — FAMOTIDINE 20 MG/1
20 TABLET, FILM COATED ORAL 2 TIMES DAILY
Qty: 60 TAB | Refills: 1 | Status: SHIPPED | OUTPATIENT
Start: 2020-10-26 | End: 2020-11-25 | Stop reason: SDUPTHER

## 2020-11-03 ENCOUNTER — APPOINTMENT (OUTPATIENT)
Dept: RADIOLOGY | Facility: MEDICAL CENTER | Age: 25
End: 2020-11-03
Attending: STUDENT IN AN ORGANIZED HEALTH CARE EDUCATION/TRAINING PROGRAM
Payer: MEDICAID

## 2020-11-03 ENCOUNTER — HOSPITAL ENCOUNTER (EMERGENCY)
Facility: MEDICAL CENTER | Age: 25
End: 2020-11-03
Attending: OBSTETRICS & GYNECOLOGY | Admitting: OBSTETRICS & GYNECOLOGY
Payer: MEDICAID

## 2020-11-03 VITALS
WEIGHT: 180.78 LBS | TEMPERATURE: 98.8 F | SYSTOLIC BLOOD PRESSURE: 105 MMHG | DIASTOLIC BLOOD PRESSURE: 59 MMHG | OXYGEN SATURATION: 98 % | RESPIRATION RATE: 18 BRPM | HEART RATE: 77 BPM | BODY MASS INDEX: 33.06 KG/M2

## 2020-11-03 DIAGNOSIS — R10.11 COLICKY RUQ ABDOMINAL PAIN: ICD-10-CM

## 2020-11-03 LAB
ALBUMIN SERPL BCP-MCNC: 3 G/DL (ref 3.2–4.9)
ALBUMIN/GLOB SERPL: 1 G/DL
ALP SERPL-CCNC: 100 U/L (ref 30–99)
ALT SERPL-CCNC: 13 U/L (ref 2–50)
ANION GAP SERPL CALC-SCNC: 9 MMOL/L (ref 7–16)
AST SERPL-CCNC: 12 U/L (ref 12–45)
BASOPHILS # BLD AUTO: 0.2 % (ref 0–1.8)
BASOPHILS # BLD: 0.02 K/UL (ref 0–0.12)
BILIRUB SERPL-MCNC: 0.2 MG/DL (ref 0.1–1.5)
BUN SERPL-MCNC: 5 MG/DL (ref 8–22)
CALCIUM SERPL-MCNC: 8.2 MG/DL (ref 8.5–10.5)
CHLORIDE SERPL-SCNC: 104 MMOL/L (ref 96–112)
CO2 SERPL-SCNC: 21 MMOL/L (ref 20–33)
CREAT SERPL-MCNC: 0.48 MG/DL (ref 0.5–1.4)
EOSINOPHIL # BLD AUTO: 0.02 K/UL (ref 0–0.51)
EOSINOPHIL NFR BLD: 0.2 % (ref 0–6.9)
ERYTHROCYTE [DISTWIDTH] IN BLOOD BY AUTOMATED COUNT: 41.1 FL (ref 35.9–50)
GLOBULIN SER CALC-MCNC: 3.1 G/DL (ref 1.9–3.5)
GLUCOSE SERPL-MCNC: 75 MG/DL (ref 65–99)
HCT VFR BLD AUTO: 37.6 % (ref 37–47)
HGB BLD-MCNC: 12.1 G/DL (ref 12–16)
IMM GRANULOCYTES # BLD AUTO: 0.04 K/UL (ref 0–0.11)
IMM GRANULOCYTES NFR BLD AUTO: 0.3 % (ref 0–0.9)
LYMPHOCYTES # BLD AUTO: 1.27 K/UL (ref 1–4.8)
LYMPHOCYTES NFR BLD: 11 % (ref 22–41)
MCH RBC QN AUTO: 29.3 PG (ref 27–33)
MCHC RBC AUTO-ENTMCNC: 32.2 G/DL (ref 33.6–35)
MCV RBC AUTO: 91 FL (ref 81.4–97.8)
MONOCYTES # BLD AUTO: 0.23 K/UL (ref 0–0.85)
MONOCYTES NFR BLD AUTO: 2 % (ref 0–13.4)
NEUTROPHILS # BLD AUTO: 9.96 K/UL (ref 2–7.15)
NEUTROPHILS NFR BLD: 86.3 % (ref 44–72)
NRBC # BLD AUTO: 0 K/UL
NRBC BLD-RTO: 0 /100 WBC
PLATELET # BLD AUTO: 238 K/UL (ref 164–446)
PMV BLD AUTO: 11.4 FL (ref 9–12.9)
POTASSIUM SERPL-SCNC: 3.5 MMOL/L (ref 3.6–5.5)
PROT SERPL-MCNC: 6.1 G/DL (ref 6–8.2)
RBC # BLD AUTO: 4.13 M/UL (ref 4.2–5.4)
SODIUM SERPL-SCNC: 134 MMOL/L (ref 135–145)
WBC # BLD AUTO: 11.5 K/UL (ref 4.8–10.8)

## 2020-11-03 PROCEDURE — 80053 COMPREHEN METABOLIC PANEL: CPT

## 2020-11-03 PROCEDURE — 59025 FETAL NON-STRESS TEST: CPT

## 2020-11-03 PROCEDURE — 59025 FETAL NON-STRESS TEST: CPT | Mod: 26 | Performed by: OBSTETRICS & GYNECOLOGY

## 2020-11-03 PROCEDURE — 99284 EMERGENCY DEPT VISIT MOD MDM: CPT | Mod: GC,25 | Performed by: OBSTETRICS & GYNECOLOGY

## 2020-11-03 PROCEDURE — 99284 EMERGENCY DEPT VISIT MOD MDM: CPT

## 2020-11-03 PROCEDURE — 76705 ECHO EXAM OF ABDOMEN: CPT

## 2020-11-03 PROCEDURE — 36415 COLL VENOUS BLD VENIPUNCTURE: CPT

## 2020-11-03 PROCEDURE — 85025 COMPLETE CBC W/AUTO DIFF WBC: CPT

## 2020-11-03 RX ORDER — OXYCODONE HYDROCHLORIDE 5 MG/1
5 TABLET ORAL EVERY 8 HOURS PRN
Qty: 20 TAB | Refills: 0 | Status: SHIPPED | OUTPATIENT
Start: 2020-11-03 | End: 2020-11-30

## 2020-11-03 RX ORDER — ACETAMINOPHEN 500 MG
1000 TABLET ORAL EVERY 8 HOURS PRN
Qty: 30 TAB | Refills: 2 | Status: ON HOLD | OUTPATIENT
Start: 2020-11-03 | End: 2020-12-02

## 2020-11-03 ASSESSMENT — FIBROSIS 4 INDEX: FIB4 SCORE: 0.34

## 2020-11-03 ASSESSMENT — PAIN SCALES - GENERAL: PAINLEVEL: 4

## 2020-11-03 NOTE — PROGRESS NOTES
1040 Pt here via [facility]  With complaints of Right upper quad pain. Pt to triage #4 oriented to room and call system. MFTI done. External monitors placed. FHT  's 1303. Baby very active difficult to monitor. Pt denies uc's bleeding or leaking of fluid.   MD in to see pt- assessment done. US ordered and in room. Tech states there is still sludge. See report for detail. Pt will follow up wit [facility]. Reactive NST Pt discharged to home,

## 2020-11-03 NOTE — PROGRESS NOTES
LABOR AND DELIVERY TRIAGE NOTE    PATIENT ID:  NAME:  Yamilex Ribera  MRN:               [id number]  YOB: 1995    CC:  RUQ abdominal pain    HPI:  Yamilex Ribera is a 25 y.o. female  at 30w3d by LMP. Patient's last menstrual period was 2020 (exact date).. Estimated Date of Delivery: 21  Patient presents complaining of RUQ abdominal pain. Notes that it has been bothering her throughout most of the pregnancy. Was evaluated at L&D triage on  for the same complaint. Was started on Pepsid after that visit and pain got better, but has been bothering her for the past 3 weeks. Typically notices the pain after meals. Has some radiation to her back. Described as a sharp pain. Has associated nausea, vomiting diarrhea. No hematemesis. No melena. No hematochezia. Pepsid now only helping intermittently. Denies any alcohol or NSAID use.    Positive fetal movement. Negative contractions. Negative vaginal bleeding or loss of fluid.    ROS: Patient denies any fever chills, nausea, vomiting, headache, chest pain, shortness of breath, or dysuria or unusual swelling of hands or feet.     Prenatal Care: Obtained at [facility]    Prenatal Labs:   HepBsAg: NR HIV: NR Rubella: immune   RPR: NR PAP GBS: none   GC/CT: neg/neg O+/ Ab neg Quad Screen   No results for input(s): WBC, RBC, HEMOGLOBIN, HEMATOCRIT, MCV, MCH, RDW, PLATELETCT, MPV, NEUTSPOLYS, LYMPHOCYTES, MONOCYTES, EOSINOPHILS, BASOPHILS, RBCMORPHOLO in the last 72 hours.  No results for input(s): SODIUM, POTASSIUM, CHLORIDE, CO2, GLUCOSE, BUN, CPKTOTAL in the last 72 hours.       IMAGING:  RUQ US on  with gallbladder sludge. No gallbladder wall thickening or pericholecystic fluid. Dilated CBD (12.30 mm). Hepatic steatosis.     POB Hx:  OB History    Para Term  AB Living   3 2 2     2   SAB TAB Ectopic Molar Multiple Live Births             2      # Outcome Date GA Lbr Jose/2nd Weight Sex Delivery Anes PTL Lv   3 Current             2 Term 18 39w0d  3.714 kg (8 lb 3 oz) M CS-Unspec   KECIA      Birth Comments: Pt states having issues with her placenta, baby was also having respiration problems   1 Term 11/09/15 39w0d  3.204 kg (7 lb 1 oz) F Vag-Spont EPI  KECIA       PMH/Problem List:    Past Medical History:   Diagnosis Date   • Migraines 2020     Patient Active Problem List    Diagnosis Date Noted   • Acid indigestion 2020   • ASCUS of cervix with negative high risk HPV 2020   • Encounter for supervision of high risk pregnancy in second trimester, antepartum 2020   • Hx of  section x 1 undecided about repeat or TOLAC 2020   • Migraines 2020       Current Outpatient Medications:  No current facility-administered medications on file prior to encounter.      Current Outpatient Medications on File Prior to Encounter   Medication Sig Dispense Refill   • Prenatal MV-Min-Fe Fum-FA-DHA (PRENATAL 1 PO) Take  by mouth.         PSH:    Past Surgical History:   Procedure Laterality Date   • PRIMARY C SECTION         Allergies:   No Known Allergies    SH:  Social History     Socioeconomic History   • Marital status:      Spouse name: Not on file   • Number of children: Not on file   • Years of education: Not on file   • Highest education level: Not on file   Occupational History   • Not on file   Social Needs   • Financial resource strain: Not on file   • Food insecurity     Worry: Not on file     Inability: Not on file   • Transportation needs     Medical: Not on file     Non-medical: Not on file   Tobacco Use   • Smoking status: Never Smoker   • Smokeless tobacco: Never Used   Substance and Sexual Activity   • Alcohol use: Never     Frequency: Never   • Drug use: Never   • Sexual activity: Yes     Partners: Male     Comment: none   Lifestyle   • Physical activity     Days per week: Not on file     Minutes per session: Not on file   • Stress: Not on file   Relationships   • Social connections      Talks on phone: Not on file     Gets together: Not on file     Attends Voodoo service: Not on file     Active member of club or organization: Not on file     Attends meetings of clubs or organizations: Not on file     Relationship status: Not on file   • Intimate partner violence     Fear of current or ex partner: Not on file     Emotionally abused: Not on file     Physically abused: Not on file     Forced sexual activity: Not on file   Other Topics Concern   • Not on file   Social History Narrative   • Not on file         PHYSICAL EXAM:  Vitals:    20 1101 20 1105 20 1123   BP:  105/59 105/59   Pulse: 86 77 77   Resp:   18   Temp:   37.1 °C (98.8 °F)   TempSrc:   Temporal   SpO2: 98%  98%     Temp (24hrs), Av.1 °C (98.8 °F), Min:37.1 °C (98.8 °F), Max:37.1 °C (98.8 °F)    General: No acute distress, resting comfortably in bed.  HEENT: normocephalic, nontraumatic, PERRLA, EOMI  Cardiovascular: Heart RRR with no murmurs, rubs or gallops. Distal Pulses 2+  Respiratory: symmetric chest expansion, lungs CTA bilaterally with no wheezes rales or  rhonci  Abdomen: gravid, +May's sign, no rebound, no guarding, soft, no masses, no HSM  Musculoskeletal: strength 5/5 in four extremities  Neuro: non focal with no numbness, tingling or changes in sensation    SVE: deferred  [place]: no contractions noted; EFM: 130 baseline/ mod variability/ positive accels with no decels    A/P:  Yamilex Ribera is a 25 y.o. female  at 30w3d by LMP. Here with chief complaint of RUQ abdominal pain.   #RUQ Abdominal pain  - CBC, CMP, RUQ US  - Consider MRCP  - Spoke with GI, Dr. Villatoro, if MRCP attainable would be useful, if labs and ultrasound were reassuring that patient could likely be discharged home, appreciate recommendations  - Will likely require GI follow-up outpatient  # Precautions and FKC reviewed.  # To return with increased frequency/intensity UCs, LOF, VB, or decreased FM.  # F/u with [facility]  as scheduled.    Tim Dejesus D.O.

## 2020-11-13 ENCOUNTER — ROUTINE PRENATAL (OUTPATIENT)
Dept: OBGYN | Facility: CLINIC | Age: 25
End: 2020-11-13

## 2020-11-13 VITALS — WEIGHT: 183.4 LBS | BODY MASS INDEX: 33.54 KG/M2 | DIASTOLIC BLOOD PRESSURE: 60 MMHG | SYSTOLIC BLOOD PRESSURE: 110 MMHG

## 2020-11-13 DIAGNOSIS — Z98.891 HISTORY OF CESAREAN DELIVERY: ICD-10-CM

## 2020-11-13 DIAGNOSIS — O09.899 SUPERVISION OF OTHER HIGH RISK PREGNANCY, ANTEPARTUM: Primary | ICD-10-CM

## 2020-11-13 PROBLEM — O34.219 HX OF CESAREAN SECTION COMPLICATING PREGNANCY: Status: RESOLVED | Noted: 2020-06-30 | Resolved: 2020-11-13

## 2020-11-13 PROCEDURE — 90040 PR PRENATAL FOLLOW UP: CPT | Performed by: NURSE PRACTITIONER

## 2020-11-13 PROCEDURE — 90715 TDAP VACCINE 7 YRS/> IM: CPT | Performed by: NURSE PRACTITIONER

## 2020-11-13 PROCEDURE — 90471 IMMUNIZATION ADMIN: CPT | Performed by: NURSE PRACTITIONER

## 2020-11-13 ASSESSMENT — FIBROSIS 4 INDEX: FIB4 SCORE: 0.34

## 2020-11-13 NOTE — PROGRESS NOTES
OB follow up   + fetal movement. Active  No VB, LOF or UC's.  Flu vaccine offered Declined  Phone #  [phone]  Preferred pharmacy confirmed.  C/o  Cramping this morning   TDAP Today   Kick count sheet given today.  BTL Signed today     NDC: 70179-007-54  LOT#:   Expiration Date: 2022  Dose: 0.5  Site: Right Deltoid  Patient educated on use and side effects of medication. Name and  verified prior to injection. Pt tolerated? Well   Verified by Sue  Administered by Christine Palacios, Med Ass't at 4:13 PM.  Patient Provided Medication: no

## 2020-11-13 NOTE — LETTER
"Count Your Baby's Movements  Another step to a healthy delivery    Yamilex Cardenasgado Ribera             Dept: [phone]    How Many Weeks Pregnant? 31W6D    Date to Begin Countin2020              How to use this chart    One way for your physician to keep track of your baby's health is by knowing how often the baby moves (or \"kicks\") in your womb.  You can help your physician to do this by using this chart every day.    Every day, you should see how many hours it takes for your baby to move 10 times.  Start in the morning, as soon as you get up.    · First, write down the time your baby moves until you get to 10.  · Check off one box every time your baby moves until you get to 10.  · Write down the time you finished counting in the last column.  · Total how long it took to count up all 10 movements.  · Finally, fill in the box that shows how long this took.  After counting 10 movements, you no longer have to count any more that day.  The next morning, just start counting again as soon as you get up.    What should you call a \"movement\"?  It is hard to say, because it will feel different from one mother to another and from one pregnancy to the next.  The important thing is that you count the movements the same way throughout your pregnancy.  If you have more questions, you should ask your physician.    Count carefully every day!  SAMPLE:  Week 28    How many hours did it take to feel 10 movements?       Start  Time     1     2     3     4     5     6     7     8     9     10   Finish Time   Mon 8:20 ·  ·  ·  ·  ·  ·  ·  ·  ·  ·  11:40                  Sat               Sun                 IMPORTANT: You should contact your physician if it takes more than two hours for you to feel 10 movements.  Each morning, write down the time and start to count the movements of your baby.  Keep track by checking off one box every time you feel one movement.  When " "you have felt 10 \"kicks\", write down the time you finished counting in the last column.  Then fill in the   box (over the check petty) for the number of hours it took.  Be sure to read the complete instructions on the previous page.            "

## 2020-11-13 NOTE — PROGRESS NOTES
S) Pt is a 25 y.o.   at 31w6d  gestation. Routine prenatal care today. No complaints today. Labs up to date. Offered TDAP and flu, desires TDAP, but declines flu. Discussed delivery, desires to have repeat . She is unsure about tubal, but will sign papers today. Will suggest repeat MD appt to discuss  as this was suggested by the last MD that saw her.  labor precautions reviewed, all questions answered.   Fetal movement Normal  Cramping no  VB no  LOF no   Denies dysuria. Generally feels well today. Good self-care activities identified. Denies headaches, swelling, visual changes, or epigastric pain .     O) /60   Wt 83.2 kg (183 lb 6.4 oz)         Labs:       PNL: WNL       GCT: 82        AFP: Not Examined       GBS: N/A       Pertinent ultrasound -        20- Survey WNL, JACQUELYN 17.68cm, c/w prev dating.    A) IUP at 31w6d       S=D         Patient Active Problem List    Diagnosis Date Noted   • Acid indigestion 2020   • ASCUS of cervix with negative high risk HPV 2020   • Supervision of other high risk pregnancy, antepartum 2020   • Hx of  section x 1 undecided about repeat or TOLAC 2020   • Migraines 2020          SVE: deferred       Chaperone offered: n/a         TDAP: yes       FLU: no        BTL: yes       : no       C/S Consent: yes       IOL or C/S scheduled: no       LAST PAP: 20- ASCUS with HR HPV. Repeat pap in 3 years         P) s/s ptl vs general discomforts. Fetal movements reviewed. General ed and anticipatory guidance. Nutrition/exercise/vitamin. Plans breast Plans pp contraception- unsure  Continue PNV.

## 2020-11-16 ENCOUNTER — TELEPHONE (OUTPATIENT)
Dept: OBGYN | Facility: CLINIC | Age: 25
End: 2020-11-16

## 2020-11-16 NOTE — TELEPHONE ENCOUNTER
Pt called stating she has been seen in ER for RUQ pain, issues with gallbladder. Pt stated she was given narcotics and that has been helping. I asked pt if she has made an appt with GI (pt was referred by ER MD) pt stated she called and was informed they are not taking new patients at this time. I called [facility], spoke with Hazel and informed me they have referral but no notes that pt was informed they can not see her.  Hazel will call pt and schedule an appt, Pt informed, then stated she was told in the hospital that GI was not taking new patients at this time. Informed pt Hazel will be calling her to schedule appt. Pt understood and had no further questions or concerns

## 2020-11-25 RX ORDER — FAMOTIDINE 20 MG/1
20 TABLET, FILM COATED ORAL 2 TIMES DAILY
Qty: 60 TAB | Refills: 1 | Status: SHIPPED | OUTPATIENT
Start: 2020-11-25 | End: 2021-02-05

## 2020-11-29 ENCOUNTER — APPOINTMENT (OUTPATIENT)
Dept: RADIOLOGY | Facility: MEDICAL CENTER | Age: 25
DRG: 833 | End: 2020-11-29
Attending: OBSTETRICS & GYNECOLOGY
Payer: MEDICAID

## 2020-11-29 ENCOUNTER — HOSPITAL ENCOUNTER (INPATIENT)
Facility: MEDICAL CENTER | Age: 25
LOS: 2 days | DRG: 833 | End: 2020-12-02
Attending: OBSTETRICS & GYNECOLOGY | Admitting: OBSTETRICS & GYNECOLOGY
Payer: MEDICAID

## 2020-11-29 LAB
ALBUMIN SERPL BCP-MCNC: 3.6 G/DL (ref 3.2–4.9)
ALBUMIN/GLOB SERPL: 1.1 G/DL
ALP SERPL-CCNC: 183 U/L (ref 30–99)
ALT SERPL-CCNC: 26 U/L (ref 2–50)
AMYLASE SERPL-CCNC: 68 U/L (ref 20–103)
ANION GAP SERPL CALC-SCNC: 9 MMOL/L (ref 7–16)
AST SERPL-CCNC: 32 U/L (ref 12–45)
BASOPHILS # BLD AUTO: 0.3 % (ref 0–1.8)
BASOPHILS # BLD: 0.02 K/UL (ref 0–0.12)
BILIRUB SERPL-MCNC: 0.9 MG/DL (ref 0.1–1.5)
BUN SERPL-MCNC: 7 MG/DL (ref 8–22)
CALCIUM SERPL-MCNC: 9.1 MG/DL (ref 8.5–10.5)
CHLORIDE SERPL-SCNC: 104 MMOL/L (ref 96–112)
CO2 SERPL-SCNC: 22 MMOL/L (ref 20–33)
COVID ORDER STATUS COVID19: NORMAL
CREAT SERPL-MCNC: 0.53 MG/DL (ref 0.5–1.4)
EOSINOPHIL # BLD AUTO: 0.03 K/UL (ref 0–0.51)
EOSINOPHIL NFR BLD: 0.4 % (ref 0–6.9)
ERYTHROCYTE [DISTWIDTH] IN BLOOD BY AUTOMATED COUNT: 42.1 FL (ref 35.9–50)
GLOBULIN SER CALC-MCNC: 3.4 G/DL (ref 1.9–3.5)
GLUCOSE SERPL-MCNC: 75 MG/DL (ref 65–99)
HCT VFR BLD AUTO: 41.5 % (ref 37–47)
HGB BLD-MCNC: 13.3 G/DL (ref 12–16)
IMM GRANULOCYTES # BLD AUTO: 0.03 K/UL (ref 0–0.11)
IMM GRANULOCYTES NFR BLD AUTO: 0.4 % (ref 0–0.9)
LIPASE SERPL-CCNC: 44 U/L (ref 11–82)
LYMPHOCYTES # BLD AUTO: 1.48 K/UL (ref 1–4.8)
LYMPHOCYTES NFR BLD: 19.9 % (ref 22–41)
MCH RBC QN AUTO: 29.4 PG (ref 27–33)
MCHC RBC AUTO-ENTMCNC: 32 G/DL (ref 33.6–35)
MCV RBC AUTO: 91.6 FL (ref 81.4–97.8)
MONOCYTES # BLD AUTO: 0.36 K/UL (ref 0–0.85)
MONOCYTES NFR BLD AUTO: 4.8 % (ref 0–13.4)
NEUTROPHILS # BLD AUTO: 5.52 K/UL (ref 2–7.15)
NEUTROPHILS NFR BLD: 74.2 % (ref 44–72)
NRBC # BLD AUTO: 0 K/UL
NRBC BLD-RTO: 0 /100 WBC
PLATELET # BLD AUTO: 222 K/UL (ref 164–446)
PMV BLD AUTO: 11.2 FL (ref 9–12.9)
POTASSIUM SERPL-SCNC: 3.8 MMOL/L (ref 3.6–5.5)
PROT SERPL-MCNC: 7 G/DL (ref 6–8.2)
RBC # BLD AUTO: 4.53 M/UL (ref 4.2–5.4)
SARS-COV+SARS-COV-2 AG RESP QL IA.RAPID: NOTDETECTED
SODIUM SERPL-SCNC: 135 MMOL/L (ref 135–145)
SPECIMEN SOURCE: NORMAL
WBC # BLD AUTO: 7.4 K/UL (ref 4.8–10.8)

## 2020-11-29 PROCEDURE — 76816 OB US FOLLOW-UP PER FETUS: CPT

## 2020-11-29 PROCEDURE — A9270 NON-COVERED ITEM OR SERVICE: HCPCS | Performed by: OBSTETRICS & GYNECOLOGY

## 2020-11-29 PROCEDURE — 83690 ASSAY OF LIPASE: CPT

## 2020-11-29 PROCEDURE — 87426 SARSCOV CORONAVIRUS AG IA: CPT

## 2020-11-29 PROCEDURE — 700102 HCHG RX REV CODE 250 W/ 637 OVERRIDE(OP): Performed by: OBSTETRICS & GYNECOLOGY

## 2020-11-29 PROCEDURE — 80053 COMPREHEN METABOLIC PANEL: CPT

## 2020-11-29 PROCEDURE — 82150 ASSAY OF AMYLASE: CPT

## 2020-11-29 PROCEDURE — C9803 HOPD COVID-19 SPEC COLLECT: HCPCS | Performed by: OBSTETRICS & GYNECOLOGY

## 2020-11-29 PROCEDURE — 700111 HCHG RX REV CODE 636 W/ 250 OVERRIDE (IP): Performed by: OBSTETRICS & GYNECOLOGY

## 2020-11-29 PROCEDURE — 99283 EMERGENCY DEPT VISIT LOW MDM: CPT

## 2020-11-29 PROCEDURE — 96374 THER/PROPH/DIAG INJ IV PUSH: CPT

## 2020-11-29 PROCEDURE — 85025 COMPLETE CBC W/AUTO DIFF WBC: CPT

## 2020-11-29 PROCEDURE — 96376 TX/PRO/DX INJ SAME DRUG ADON: CPT

## 2020-11-29 PROCEDURE — 59025 FETAL NON-STRESS TEST: CPT

## 2020-11-29 PROCEDURE — 96375 TX/PRO/DX INJ NEW DRUG ADDON: CPT

## 2020-11-29 PROCEDURE — 700105 HCHG RX REV CODE 258: Performed by: OBSTETRICS & GYNECOLOGY

## 2020-11-29 PROCEDURE — 99284 EMERGENCY DEPT VISIT MOD MDM: CPT | Performed by: OBSTETRICS & GYNECOLOGY

## 2020-11-29 PROCEDURE — 36415 COLL VENOUS BLD VENIPUNCTURE: CPT

## 2020-11-29 RX ORDER — MORPHINE SULFATE 4 MG/ML
2 INJECTION, SOLUTION INTRAMUSCULAR; INTRAVENOUS
Status: DISCONTINUED | OUTPATIENT
Start: 2020-11-29 | End: 2020-12-02

## 2020-11-29 RX ORDER — URSODIOL 300 MG/1
300 CAPSULE ORAL 3 TIMES DAILY
Status: DISCONTINUED | OUTPATIENT
Start: 2020-11-29 | End: 2020-12-02 | Stop reason: HOSPADM

## 2020-11-29 RX ORDER — URSODIOL 300 MG/1
300 CAPSULE ORAL 2 TIMES DAILY
COMMUNITY
End: 2020-12-23

## 2020-11-29 RX ORDER — SODIUM CHLORIDE, SODIUM LACTATE, POTASSIUM CHLORIDE, CALCIUM CHLORIDE 600; 310; 30; 20 MG/100ML; MG/100ML; MG/100ML; MG/100ML
INJECTION, SOLUTION INTRAVENOUS CONTINUOUS
Status: DISCONTINUED | OUTPATIENT
Start: 2020-11-29 | End: 2020-12-02 | Stop reason: HOSPADM

## 2020-11-29 RX ORDER — ONDANSETRON 2 MG/ML
4 INJECTION INTRAMUSCULAR; INTRAVENOUS EVERY 4 HOURS PRN
Status: DISCONTINUED | OUTPATIENT
Start: 2020-11-29 | End: 2020-12-02 | Stop reason: HOSPADM

## 2020-11-29 RX ADMIN — MORPHINE SULFATE 2 MG: 4 INJECTION INTRAVENOUS at 16:23

## 2020-11-29 RX ADMIN — ONDANSETRON 4 MG: 2 INJECTION INTRAMUSCULAR; INTRAVENOUS at 19:18

## 2020-11-29 RX ADMIN — MORPHINE SULFATE 2 MG: 4 INJECTION INTRAVENOUS at 14:10

## 2020-11-29 RX ADMIN — MORPHINE SULFATE 2 MG: 4 INJECTION INTRAVENOUS at 23:57

## 2020-11-29 RX ADMIN — SODIUM CHLORIDE, POTASSIUM CHLORIDE, SODIUM LACTATE AND CALCIUM CHLORIDE: 600; 310; 30; 20 INJECTION, SOLUTION INTRAVENOUS at 18:38

## 2020-11-29 RX ADMIN — MORPHINE SULFATE 2 MG: 4 INJECTION INTRAVENOUS at 18:38

## 2020-11-29 RX ADMIN — SODIUM CHLORIDE, POTASSIUM CHLORIDE, SODIUM LACTATE AND CALCIUM CHLORIDE: 600; 310; 30; 20 INJECTION, SOLUTION INTRAVENOUS at 14:07

## 2020-11-29 RX ADMIN — URSODIOL 300 MG: 300 CAPSULE ORAL at 21:23

## 2020-11-29 RX ADMIN — LIDOCAINE HYDROCHLORIDE 30 ML: 20 SOLUTION OROPHARYNGEAL at 12:45

## 2020-11-29 RX ADMIN — MORPHINE SULFATE 2 MG: 4 INJECTION INTRAVENOUS at 21:23

## 2020-11-29 ASSESSMENT — COPD QUESTIONNAIRES
DO YOU EVER COUGH UP ANY MUCUS OR PHLEGM?: NO/ONLY WITH OCCASIONAL COLDS OR INFECTIONS
IN THE PAST 12 MONTHS DO YOU DO LESS THAN YOU USED TO BECAUSE OF YOUR BREATHING PROBLEMS: DISAGREE/UNSURE
DURING THE PAST 4 WEEKS HOW MUCH DID YOU FEEL SHORT OF BREATH: NONE/LITTLE OF THE TIME
HAVE YOU SMOKED AT LEAST 100 CIGARETTES IN YOUR ENTIRE LIFE: NO/DON'T KNOW

## 2020-11-29 ASSESSMENT — ENCOUNTER SYMPTOMS
EYES NEGATIVE: 1
VOMITING: 1
MUSCULOSKELETAL NEGATIVE: 1
RESPIRATORY NEGATIVE: 1
PSYCHIATRIC NEGATIVE: 1
ABDOMINAL PAIN: 1
CARDIOVASCULAR NEGATIVE: 1
NAUSEA: 1
WEIGHT LOSS: 1
NEUROLOGICAL NEGATIVE: 1

## 2020-11-29 ASSESSMENT — PAIN DESCRIPTION - PAIN TYPE
TYPE: ACUTE PAIN

## 2020-11-29 ASSESSMENT — LIFESTYLE VARIABLES
TOTAL SCORE: 0
HAVE PEOPLE ANNOYED YOU BY CRITICIZING YOUR DRINKING: NO
ALCOHOL_USE: NO
EVER_SMOKED: NEVER
DOES PATIENT WANT TO STOP DRINKING: NO
HAVE YOU EVER FELT YOU SHOULD CUT DOWN ON YOUR DRINKING: NO
EVER FELT BAD OR GUILTY ABOUT YOUR DRINKING: NO
ON A TYPICAL DAY WHEN YOU DRINK ALCOHOL HOW MANY DRINKS DO YOU HAVE: 0
TOTAL SCORE: 0
EVER HAD A DRINK FIRST THING IN THE MORNING TO STEADY YOUR NERVES TO GET RID OF A HANGOVER: NO
AVERAGE NUMBER OF DAYS PER WEEK YOU HAVE A DRINK CONTAINING ALCOHOL: 0
TOTAL SCORE: 0
CONSUMPTION TOTAL: NEGATIVE
HOW MANY TIMES IN THE PAST YEAR HAVE YOU HAD 5 OR MORE DRINKS IN A DAY: 0

## 2020-11-29 ASSESSMENT — FIBROSIS 4 INDEX: FIB4 SCORE: 0.34

## 2020-11-29 ASSESSMENT — PATIENT HEALTH QUESTIONNAIRE - PHQ9
1. LITTLE INTEREST OR PLEASURE IN DOING THINGS: NOT AT ALL
2. FEELING DOWN, DEPRESSED, IRRITABLE, OR HOPELESS: NOT AT ALL
SUM OF ALL RESPONSES TO PHQ9 QUESTIONS 1 AND 2: 0
2. FEELING DOWN, DEPRESSED, IRRITABLE, OR HOPELESS: NOT AT ALL
1. LITTLE INTEREST OR PLEASURE IN DOING THINGS: NOT AT ALL
SUM OF ALL RESPONSES TO PHQ9 QUESTIONS 1 AND 2: 0

## 2020-11-29 ASSESSMENT — PAIN SCALES - GENERAL
PAINLEVEL: 9
PAINLEVEL: 5 - MODERATE PAIN

## 2020-11-29 NOTE — PROGRESS NOTES
1100  reporting right upper quadrant pain, burning back pain and cramping since Friday. Reports nausea/vomiting for the past month. Reports blood tinged mucus this morning and reports she has been struggling with gallbladder problems this pregnancy. Also states decreased fetal movement since Friday. Patient placed on monitor and report given to Dr. Escobedo and Chandrika NATION RN.

## 2020-11-29 NOTE — PROGRESS NOTES
1130 - Report received. POC discussed. Lab orders in place.   1300 - Updated Dr Escobedo on lab results. MD reviewed.   1320 - Dr Escobedo at bedside to discuss POC with pt. Pt transferred to [facility] for overnight obs. New orders in place.   1900 - Report given. POC discussed. Pt reports no pain relief from previous dose of morphine. Zofran given.

## 2020-11-30 ENCOUNTER — ANESTHESIA EVENT (OUTPATIENT)
Dept: SURGERY | Facility: MEDICAL CENTER | Age: 25
DRG: 833 | End: 2020-11-30
Payer: MEDICAID

## 2020-11-30 ENCOUNTER — APPOINTMENT (OUTPATIENT)
Dept: RADIOLOGY | Facility: MEDICAL CENTER | Age: 25
DRG: 833 | End: 2020-11-30
Attending: OBSTETRICS & GYNECOLOGY
Payer: MEDICAID

## 2020-11-30 LAB
ALBUMIN SERPL BCP-MCNC: 3.2 G/DL (ref 3.2–4.9)
ALBUMIN/GLOB SERPL: 1 G/DL
ALP SERPL-CCNC: 159 U/L (ref 30–99)
ALT SERPL-CCNC: 23 U/L (ref 2–50)
ANION GAP SERPL CALC-SCNC: 6 MMOL/L (ref 7–16)
AST SERPL-CCNC: 19 U/L (ref 12–45)
BASOPHILS # BLD AUTO: 0.2 % (ref 0–1.8)
BASOPHILS # BLD: 0.01 K/UL (ref 0–0.12)
BILIRUB SERPL-MCNC: 0.4 MG/DL (ref 0.1–1.5)
BUN SERPL-MCNC: 6 MG/DL (ref 8–22)
CALCIUM SERPL-MCNC: 8.8 MG/DL (ref 8.5–10.5)
CHLORIDE SERPL-SCNC: 105 MMOL/L (ref 96–112)
CO2 SERPL-SCNC: 27 MMOL/L (ref 20–33)
CREAT SERPL-MCNC: 0.56 MG/DL (ref 0.5–1.4)
EOSINOPHIL # BLD AUTO: 0.04 K/UL (ref 0–0.51)
EOSINOPHIL NFR BLD: 0.7 % (ref 0–6.9)
ERYTHROCYTE [DISTWIDTH] IN BLOOD BY AUTOMATED COUNT: 43.2 FL (ref 35.9–50)
GLOBULIN SER CALC-MCNC: 3.1 G/DL (ref 1.9–3.5)
GLUCOSE SERPL-MCNC: 99 MG/DL (ref 65–99)
HCT VFR BLD AUTO: 39.5 % (ref 37–47)
HGB BLD-MCNC: 12.7 G/DL (ref 12–16)
IMM GRANULOCYTES # BLD AUTO: 0.02 K/UL (ref 0–0.11)
IMM GRANULOCYTES NFR BLD AUTO: 0.3 % (ref 0–0.9)
LYMPHOCYTES # BLD AUTO: 1.58 K/UL (ref 1–4.8)
LYMPHOCYTES NFR BLD: 27.6 % (ref 22–41)
MAGNESIUM SERPL-MCNC: 1.7 MG/DL (ref 1.5–2.5)
MCH RBC QN AUTO: 29.8 PG (ref 27–33)
MCHC RBC AUTO-ENTMCNC: 32.2 G/DL (ref 33.6–35)
MCV RBC AUTO: 92.7 FL (ref 81.4–97.8)
MONOCYTES # BLD AUTO: 0.28 K/UL (ref 0–0.85)
MONOCYTES NFR BLD AUTO: 4.9 % (ref 0–13.4)
NEUTROPHILS # BLD AUTO: 3.79 K/UL (ref 2–7.15)
NEUTROPHILS NFR BLD: 66.3 % (ref 44–72)
NRBC # BLD AUTO: 0 K/UL
NRBC BLD-RTO: 0 /100 WBC
PLATELET # BLD AUTO: 199 K/UL (ref 164–446)
PMV BLD AUTO: 11.3 FL (ref 9–12.9)
POTASSIUM SERPL-SCNC: 3.9 MMOL/L (ref 3.6–5.5)
PROT SERPL-MCNC: 6.3 G/DL (ref 6–8.2)
RBC # BLD AUTO: 4.26 M/UL (ref 4.2–5.4)
SODIUM SERPL-SCNC: 138 MMOL/L (ref 135–145)
WBC # BLD AUTO: 5.7 K/UL (ref 4.8–10.8)

## 2020-11-30 PROCEDURE — 80053 COMPREHEN METABOLIC PANEL: CPT

## 2020-11-30 PROCEDURE — 59025 FETAL NON-STRESS TEST: CPT

## 2020-11-30 PROCEDURE — A9270 NON-COVERED ITEM OR SERVICE: HCPCS | Performed by: OBSTETRICS & GYNECOLOGY

## 2020-11-30 PROCEDURE — 36415 COLL VENOUS BLD VENIPUNCTURE: CPT

## 2020-11-30 PROCEDURE — 85025 COMPLETE CBC W/AUTO DIFF WBC: CPT

## 2020-11-30 PROCEDURE — 700102 HCHG RX REV CODE 250 W/ 637 OVERRIDE(OP): Performed by: OBSTETRICS & GYNECOLOGY

## 2020-11-30 PROCEDURE — 700105 HCHG RX REV CODE 258: Performed by: OBSTETRICS & GYNECOLOGY

## 2020-11-30 PROCEDURE — 700111 HCHG RX REV CODE 636 W/ 250 OVERRIDE (IP): Performed by: OBSTETRICS & GYNECOLOGY

## 2020-11-30 PROCEDURE — 96372 THER/PROPH/DIAG INJ SC/IM: CPT

## 2020-11-30 PROCEDURE — 96376 TX/PRO/DX INJ SAME DRUG ADON: CPT

## 2020-11-30 PROCEDURE — 59025 FETAL NON-STRESS TEST: CPT | Mod: 26 | Performed by: OBSTETRICS & GYNECOLOGY

## 2020-11-30 PROCEDURE — 83735 ASSAY OF MAGNESIUM: CPT

## 2020-11-30 PROCEDURE — 99231 SBSQ HOSP IP/OBS SF/LOW 25: CPT | Mod: 25 | Performed by: OBSTETRICS & GYNECOLOGY

## 2020-11-30 PROCEDURE — 302790 HCHG STAT ANTEPARTUM CARE, DAILY

## 2020-11-30 PROCEDURE — 770002 HCHG ROOM/CARE - OB PRIVATE (112)

## 2020-11-30 PROCEDURE — 74181 MRI ABDOMEN W/O CONTRAST: CPT

## 2020-11-30 RX ORDER — SCOLOPAMINE TRANSDERMAL SYSTEM 1 MG/1
1 PATCH, EXTENDED RELEASE TRANSDERMAL ONCE
Status: DISCONTINUED | OUTPATIENT
Start: 2020-11-30 | End: 2020-12-01 | Stop reason: HOSPADM

## 2020-11-30 RX ORDER — FAMOTIDINE 20 MG/1
40 TABLET, FILM COATED ORAL 2 TIMES DAILY
Status: DISCONTINUED | OUTPATIENT
Start: 2020-11-30 | End: 2020-12-01 | Stop reason: HOSPADM

## 2020-11-30 RX ORDER — ACETAMINOPHEN 500 MG
1000 TABLET ORAL EVERY 6 HOURS PRN
Status: DISCONTINUED | OUTPATIENT
Start: 2020-11-30 | End: 2020-12-02 | Stop reason: HOSPADM

## 2020-11-30 RX ORDER — BETAMETHASONE SODIUM PHOSPHATE AND BETAMETHASONE ACETATE 3; 3 MG/ML; MG/ML
12 INJECTION, SUSPENSION INTRA-ARTICULAR; INTRALESIONAL; INTRAMUSCULAR; SOFT TISSUE EVERY 24 HOURS
Status: COMPLETED | OUTPATIENT
Start: 2020-11-30 | End: 2020-12-01

## 2020-11-30 RX ADMIN — SODIUM CHLORIDE, POTASSIUM CHLORIDE, SODIUM LACTATE AND CALCIUM CHLORIDE: 600; 310; 30; 20 INJECTION, SOLUTION INTRAVENOUS at 01:31

## 2020-11-30 RX ADMIN — URSODIOL 300 MG: 300 CAPSULE ORAL at 18:21

## 2020-11-30 RX ADMIN — ONDANSETRON 4 MG: 2 INJECTION INTRAMUSCULAR; INTRAVENOUS at 01:27

## 2020-11-30 RX ADMIN — ONDANSETRON 4 MG: 2 INJECTION INTRAMUSCULAR; INTRAVENOUS at 20:03

## 2020-11-30 RX ADMIN — ONDANSETRON 4 MG: 2 INJECTION INTRAMUSCULAR; INTRAVENOUS at 15:36

## 2020-11-30 RX ADMIN — ACETAMINOPHEN 1000 MG: 500 TABLET ORAL at 09:08

## 2020-11-30 RX ADMIN — URSODIOL 300 MG: 300 CAPSULE ORAL at 06:20

## 2020-11-30 RX ADMIN — FAMOTIDINE 40 MG: 20 TABLET, FILM COATED ORAL at 09:07

## 2020-11-30 RX ADMIN — MORPHINE SULFATE 2 MG: 4 INJECTION INTRAVENOUS at 13:54

## 2020-11-30 RX ADMIN — BETAMETHASONE SODIUM PHOSPHATE AND BETAMETHASONE ACETATE 12 MG: 3; 3 INJECTION, SUSPENSION INTRA-ARTICULAR; INTRALESIONAL; INTRAMUSCULAR at 16:09

## 2020-11-30 RX ADMIN — MORPHINE SULFATE 2 MG: 4 INJECTION INTRAVENOUS at 18:21

## 2020-11-30 RX ADMIN — SODIUM CHLORIDE, POTASSIUM CHLORIDE, SODIUM LACTATE AND CALCIUM CHLORIDE: 600; 310; 30; 20 INJECTION, SOLUTION INTRAVENOUS at 18:10

## 2020-11-30 RX ADMIN — URSODIOL 300 MG: 300 CAPSULE ORAL at 11:56

## 2020-11-30 RX ADMIN — MORPHINE SULFATE 2 MG: 4 INJECTION INTRAVENOUS at 10:40

## 2020-11-30 RX ADMIN — MORPHINE SULFATE 2 MG: 4 INJECTION INTRAVENOUS at 06:48

## 2020-11-30 RX ADMIN — SODIUM CHLORIDE, POTASSIUM CHLORIDE, SODIUM LACTATE AND CALCIUM CHLORIDE: 600; 310; 30; 20 INJECTION, SOLUTION INTRAVENOUS at 10:01

## 2020-11-30 ASSESSMENT — PATIENT HEALTH QUESTIONNAIRE - PHQ9
SUM OF ALL RESPONSES TO PHQ9 QUESTIONS 1 AND 2: 0
2. FEELING DOWN, DEPRESSED, IRRITABLE, OR HOPELESS: NOT AT ALL
2. FEELING DOWN, DEPRESSED, IRRITABLE, OR HOPELESS: NOT AT ALL
1. LITTLE INTEREST OR PLEASURE IN DOING THINGS: NOT AT ALL
SUM OF ALL RESPONSES TO PHQ9 QUESTIONS 1 AND 2: 0
1. LITTLE INTEREST OR PLEASURE IN DOING THINGS: NOT AT ALL

## 2020-11-30 ASSESSMENT — ENCOUNTER SYMPTOMS
CARDIOVASCULAR NEGATIVE: 1
BACK PAIN: 1
PSYCHIATRIC NEGATIVE: 1
ABDOMINAL PAIN: 1
EYES NEGATIVE: 1
NAUSEA: 1
NEUROLOGICAL NEGATIVE: 1
WEIGHT LOSS: 1
VOMITING: 1

## 2020-11-30 ASSESSMENT — PAIN DESCRIPTION - PAIN TYPE
TYPE: ACUTE PAIN
TYPE: ACUTE PAIN

## 2020-11-30 NOTE — CARE PLAN
Problem: Powerlessness  Goal: Patient will be able to verbalize fears and feelings of vulnerability  Outcome: PROGRESSING AS EXPECTED  Intervention: Assess factors contributing to sense of powerlessness  Note: Pt will talk to RN when feelings become overwhelming about medical situation and pain     Problem: Pain  Goal: Alleviation of Pain or a reduction in pain to the patient's comfort goal  Outcome: PROGRESSING AS EXPECTED  Note: Pt will notify RN if/when pain is no longer tolerable

## 2020-11-30 NOTE — PROGRESS NOTES
Gastroenterology Progress Note        Date & Time Created: 2020 1:08 PM    Chief Complaint:     Right upper quadrant abdominal pain, nausea,  vomiting    Interval History:    This is a 25-year-old lady who is currently at 34 weeks of pregnancy ( ), presents with symptoms suggestive of biliary colic/cholecystitis.she had ongoing intermittent symptoms since last August.  This has progressed and is continuously present at this point leading to chronic nausea and vomiting and inability to keep any food down leading to weight loss and dehydration. She was seen in the GI clinic 2 weeks ago and was started on ursodiol.  This has not helped much.    20: admitted to the hospital with the worsening right upper quadrant abdominal pain, recurrent nausea and vomiting, poor by mouth intake.  She underwent MRCP revealing choledocholithiasis.  LFTs relatively within normal limits       Review of Systems:  Review of Systems   Constitutional: Positive for malaise/fatigue and weight loss.   HENT: Negative.    Eyes: Negative.    Cardiovascular: Negative.    Gastrointestinal: Positive for abdominal pain, nausea and vomiting.   Genitourinary: Negative.    Musculoskeletal: Positive for back pain.   Skin: Negative.    Neurological: Negative.    Endo/Heme/Allergies: Negative.    Psychiatric/Behavioral: Negative.        Physical Exam:  Physical Exam  Constitutional:       Appearance: Normal appearance.   HENT:      Head: Normocephalic and atraumatic.      Right Ear: Tympanic membrane normal.      Left Ear: Tympanic membrane normal.      Nose: Nose normal.      Mouth/Throat:      Mouth: Mucous membranes are dry.   Eyes:      Extraocular Movements: Extraocular movements intact.      Pupils: Pupils are equal, round, and reactive to light.   Neck:      Musculoskeletal: Normal range of motion.   Cardiovascular:      Rate and Rhythm: Normal rate and regular rhythm.      Pulses: Normal pulses.      Heart sounds: Normal heart  sounds.   Pulmonary:      Effort: Pulmonary effort is normal.      Breath sounds: Normal breath sounds.   Abdominal:      General: There is distension.      Tenderness: There is abdominal tenderness.      Comments: Gravid uterus    Musculoskeletal: Normal range of motion.   Skin:     General: Skin is warm and dry.   Neurological:      General: No focal deficit present.      Mental Status: She is alert and oriented to person, place, and time.   Psychiatric:         Mood and Affect: Mood normal.         Thought Content: Thought content normal.         Judgment: Judgment normal.         Labs:          Recent Labs     20  1207 20  1000   SODIUM 135 138   POTASSIUM 3.8 3.9   CHLORIDE 104 105   CO2 22 27   BUN 7* 6*   CREATININE 0.53 0.56   MAGNESIUM  --  1.7   CALCIUM 9.1 8.8     Recent Labs     20  1207 20  1000   ALTSGPT 26 23   ASTSGOT 32 19   ALKPHOSPHAT 183* 159*   TBILIRUBIN 0.9 0.4   AMYLASE 68  --    LIPASE 44  --    GLUCOSE 75 99     Recent Labs     20  1207 20  1000   RBC 4.53 4.26   HEMOGLOBIN 13.3 12.7   HEMATOCRIT 41.5 39.5   PLATELETCT 222 199     Recent Labs     20  1207 20  1000   WBC 7.4 5.7   NEUTSPOLYS 74.20* 66.30   LYMPHOCYTES 19.90* 27.60   MONOCYTES 4.80 4.90   EOSINOPHILS 0.40 0.70   BASOPHILS 0.30 0.20   ASTSGOT 32 19   ALTSGPT 26 23   ALKPHOSPHAT 183* 159*   TBILIRUBIN 0.9 0.4     Hemodynamics:  Temp (24hrs), Av.6 °C (97.8 °F), Min:36.1 °C (97 °F), Max:36.8 °C (98.2 °F)  Temperature: 36.8 °C (98.2 °F)  Pulse  Av.8  Min: 72  Max: 86   Blood Pressure: 106/66     Respiratory:    Respiration: 17           Fluids:  No intake or output data in the 24 hours ending 20 1308     GI/Nutrition:  Orders Placed This Encounter   Procedures   • Diet Order Diet: Low Fiber(GI Soft)     Standing Status:   Standing     Number of Occurrences:   1     Order Specific Question:   Diet:     Answer:   Low Fiber(GI Soft) [2]     Medical Decision Making, by  Problem:  There are no active hospital problems to display for this patient.      Plan:    This is a 25-year-old lady currently at 34 weeks of gestation, presents with the biliary colic, not responding to treatments, leading to chronic nausea and vomiting affecting her PO intake.  Patient is not able to keep any food down leading to weight loss. MRCP revealed choledocholithiasis. Normal LFTs     - Case discussed with high risk OB/GYN  -The OB/GYN prefers that the patient undergo ERCP  -Risk of ERCP was discussed with the physician and the patient  - this include issues with general anesthesia, inability to cannulate the biliary tree due to pressure effect from the gravid uterus, bleeding, injury to nearby bowel, acute pancreatitis which can be anywhere from mild to severe.      -The OB/GYN team will be available for in case of complications and rapid delivery   -In case of emergent delivery the  have to be transferred to  care      Case tentatively scheduled for tomorrow 1:30 PM at the the [facility] OR   -Keep NPO after midnight   -No NSAIDs, antiplatelets or anticoagulants              Quality-Core Measures

## 2020-11-30 NOTE — H&P
History and Physical    Tita Ribera is a 25 y.o. female  at 34w1d who presents for worsening right upper quadrant pain    Subjective: 25-year-old  3 para 2-0-0-2 at 34 weeks 1 day gestational age who presents to labor delivery complaining of worsening right upper quadrant pain.  Please see previous dictations for full history.  Patient has been diagnosed with gallbladder sludge, no stones identified.  She has been followed by GI consultants.  Please see their notes for full details.  She reports that since Friday she has not been unable to keep any food down and has been losing weight.  Review of pregnancy records does show that she has had weight loss since the beginning of the pregnancy.  She is also reporting some decreased fetal movement for the last day or so.  No fevers, positive nausea and vomiting.    She was originally given some pain medications which did control her symptoms, she reports that the pain is intractable at this time and extremely debilitating.  She has tried Tylenol as well as Percocet with no improvement in her symptoms.    She was recently started on ursodiol by gastroenterology, she reports no improvement in her symptoms as well.    CTXs: negative   Pain: negative  LOF: negative  Vaginal bleeding: negative   Fetal movement: positive    ROS: Pertinent positives documented in HPI and all other systems reviewed & are negative    OB History    Para Term  AB Living   3 2 2     2   SAB TAB Ectopic Molar Multiple Live Births             2      # Outcome Date GA Lbr Jose/2nd Weight Sex Delivery Anes PTL Lv   3 Current            2 Term 18 39w0d  3.714 kg (8 lb 3 oz) M CS-Unspec   [facility]      Birth Comments: Pt states having issues with her placenta, baby was also having respiration problems   1 Term 11/09/15 39w0d  3.204 kg (7 lb 1 oz) F Vag-Spont EPI  KECIA       PGYNHx: None    Past Medical History:   Diagnosis Date   • Migraines 2020       Past Surgical  History:   Procedure Laterality Date   • PRIMARY C SECTION           Current Facility-Administered Medications:   •  morphine (pf) 4 mg/mL injection 2 mg, 2 mg, Intravenous, Q HOUR PRN, Rajan Escobedo M.D., 2 mg at 11/29/20 2357  •  ondansetron (ZOFRAN) syringe/vial injection 4 mg, 4 mg, Intravenous, Q4HRS PRN, Rajan Escobedo M.D., 4 mg at 11/30/20 0127  •  lactated ringers infusion, , Intravenous, Continuous, Rajan Escobedo M.D., Last Rate: 125 mL/hr at 11/30/20 0131, New Bag at 11/30/20 0131  •  ursodiol (ACTIGALL) capsule 300 mg, 300 mg, Oral, TID, Rajan Escobedo M.D., 300 mg at 11/29/20 2123    Allergies: Patient has no known allergies.    Social History     Socioeconomic History   • Marital status:      Spouse name: Not on file   • Number of children: Not on file   • Years of education: Not on file   • Highest education level: Not on file   Occupational History   • Not on file   Social Needs   • Financial resource strain: Not on file   • Food insecurity     Worry: Not on file     Inability: Not on file   • Transportation needs     Medical: Not on file     Non-medical: Not on file   Tobacco Use   • Smoking status: Never Smoker   • Smokeless tobacco: Never Used   Substance and Sexual Activity   • Alcohol use: Never     Frequency: Never   • Drug use: Never   • Sexual activity: Yes     Partners: Male     Comment: none   Lifestyle   • Physical activity     Days per week: Not on file     Minutes per session: Not on file   • Stress: Not on file   Relationships   • Social connections     Talks on phone: Not on file     Gets together: Not on file     Attends Ten[employer] service: Not on file     Active member of club or organization: Not on file     Attends meetings of clubs or organizations: Not on file     Relationship status: Not on file   • Intimate partner violence     Fear of current or ex partner: Not on file     Emotionally abused: Not on file     Physically abused: Not on file     Forced  "sexual activity: Not on file   Other Topics Concern   • Not on file   Social History Narrative   • Not on file         FamHx: Noncontributory    Prenatal care with [facility] with following problems:  Patient Active Problem List    Diagnosis Date Noted   • Acid indigestion 2020   • ASCUS of cervix with negative high risk HPV 2020   • Supervision of other high risk pregnancy, antepartum 2020   • History of  delivery- desires repeat, unsure about tubal 2020   • Migraines 2020         Objective:      BP (!) 93/55   Pulse 72   Temp 36.5 °C (97.7 °F) (Temporal)   Resp 17   Ht 1.549 m (5' 1\")   Wt 83 kg (183 lb)     General:   alert, cooperative, appears fatigued   Skin:   normal   HEENT:  PERRLA, EOMI and Sclera clear, anicteric   Lungs:   CTA bilateral   Heart:   S1, S2 normal, no murmur, click, rub or gallop, regular rate and rhythm, chest is clear without rales or wheezing, no pedal edema, S1, S2 normal, no murmur, regular rate and rhythm   Abdomen:   soft, gravid, NT   EFW:  2500g   Pelvis:  adequate with gynecoid pelvis   FHT:  140 BPM  Accels present  Decels absent  Variability moderate  Category 1   Uterine Size: S=D   Presentations: Cephalic   Cervix:     Dilation:     Effacement:     Station:      Consistency:     Position:      Lab Review  Lab:   Blood type: O     Recent Results (from the past 5880 hour(s))   POCT Pregnancy    Collection Time: 20  2:53 PM   Result Value Ref Range    POC Urine Pregnancy Test positive Negative    Internal Control Positive Positive     Internal Control Negative Negative    THINPREP RFLX HPV ASCUS W/CTNG    Collection Time: 20  4:17 PM   Result Value Ref Range    Cytology Reg See Path Report     C. trachomatis by PCR Negative Negative    N. gonorrhoeae by PCR Negative Negative    Source Endo/Cervical    HPV by PCR Assoc. w/Thinprep    Collection Time: 20  4:17 PM   Result Value Ref Range    Source Endo/Cervical     HPV Genotype " 16 Negative Negative    HPV Genotype 18 Negative Negative    HPV Other High Risk Genotypes Negative Negative   POC UA    Collection Time: 09/27/20  2:26 AM   Result Value Ref Range    POC Color Yellow     POC Appearance Clear     POC Glucose Negative Negative mg/dL    POC Ketones Negative Negative mg/dL    POC Specific Gravity 1.020 1.005 - 1.030    POC Blood Negative Negative    POC Urine PH 6.0 5.0 - 8.0    POC Protein Negative Negative mg/dL    POC Nitrites Negative Negative    POC Leukocyte Esterase Negative Negative   CBC WITH DIFFERENTIAL    Collection Time: 09/27/20  3:23 AM   Result Value Ref Range    WBC 13.0 (H) 4.8 - 10.8 K/uL    RBC 4.20 4.20 - 5.40 M/uL    Hemoglobin 12.7 12.0 - 16.0 g/dL    Hematocrit 38.0 37.0 - 47.0 %    MCV 90.5 81.4 - 97.8 fL    MCH 30.2 27.0 - 33.0 pg    MCHC 33.4 (L) 33.6 - 35.0 g/dL    RDW 41.1 35.9 - 50.0 fL    Platelet Count 209 164 - 446 K/uL    MPV 10.9 9.0 - 12.9 fL    Neutrophils-Polys 81.00 (H) 44.00 - 72.00 %    Lymphocytes 13.70 (L) 22.00 - 41.00 %    Monocytes 3.70 0.00 - 13.40 %    Eosinophils 0.90 0.00 - 6.90 %    Basophils 0.20 0.00 - 1.80 %    Immature Granulocytes 0.50 0.00 - 0.90 %    Nucleated RBC 0.00 /100 WBC    Neutrophils (Absolute) 10.53 (H) 2.00 - 7.15 K/uL    Lymphs (Absolute) 1.79 1.00 - 4.80 K/uL    Monos (Absolute) 0.48 0.00 - 0.85 K/uL    Eos (Absolute) 0.12 0.00 - 0.51 K/uL    Baso (Absolute) 0.03 0.00 - 0.12 K/uL    Immature Granulocytes (abs) 0.07 0.00 - 0.11 K/uL    NRBC (Absolute) 0.00 K/uL   Comp Metabolic Panel    Collection Time: 09/27/20  3:23 AM   Result Value Ref Range    Sodium 134 (L) 135 - 145 mmol/L    Potassium 3.7 3.6 - 5.5 mmol/L    Chloride 100 96 - 112 mmol/L    Co2 20 20 - 33 mmol/L    Anion Gap 14.0 7.0 - 16.0    Glucose 89 65 - 99 mg/dL    Bun 9 8 - 22 mg/dL    Creatinine 0.55 0.50 - 1.40 mg/dL    Calcium 8.6 8.5 - 10.5 mg/dL    AST(SGOT) 26 12 - 45 U/L    ALT(SGPT) 13 2 - 50 U/L    Alkaline Phosphatase 93 30 - 99 U/L    Total  Bilirubin 0.2 0.1 - 1.5 mg/dL    Albumin 3.5 3.2 - 4.9 g/dL    Total Protein 6.7 6.0 - 8.2 g/dL    Globulin 3.2 1.9 - 3.5 g/dL    A-G Ratio 1.1 g/dL   AMYLASE    Collection Time: 09/27/20  3:23 AM   Result Value Ref Range    Amylase 55 20 - 103 U/L   LIPASE    Collection Time: 09/27/20  3:23 AM   Result Value Ref Range    Lipase 31 11 - 82 U/L   ESTIMATED GFR    Collection Time: 09/27/20  3:23 AM   Result Value Ref Range    GFR If African American >60 >60 mL/min/1.73 m 2    GFR If Non African American >60 >60 mL/min/1.73 m 2   GLUCOSE 1HR GESTATIONAL    Collection Time: 10/07/20  9:25 AM   Result Value Ref Range    Glucose, Post Dose 89 70 - 139 mg/dL   PREG CNTR PRENATAL PN    Collection Time: 10/07/20  9:25 AM   Result Value Ref Range    Color Yellow     Character Cloudy (A)     Specific Gravity 1.017 <1.035    Ph 7.0 5.0 - 8.0    Glucose Negative Negative mg/dL    Ketones Trace (A) Negative mg/dL    Protein Negative Negative mg/dL    Bilirubin Negative Negative    Urobilinogen, Urine 0.2 Negative    Nitrite Negative Negative    Leukocyte Esterase Moderate (A) Negative    Occult Blood Negative Negative    Micro Urine Req Microscopic     WBC 7.6 4.8 - 10.8 K/uL    RBC 4.63 4.20 - 5.40 M/uL    Hemoglobin 13.8 12.0 - 16.0 g/dL    Hematocrit 43.5 37.0 - 47.0 %    MCV 94.0 81.4 - 97.8 fL    MCH 29.8 27.0 - 33.0 pg    MCHC 31.7 (L) 33.6 - 35.0 g/dL    RDW 45.0 35.9 - 50.0 fL    Platelet Count 232 164 - 446 K/uL    MPV 11.8 9.0 - 12.9 fL    Neutrophils-Polys 70.40 44.00 - 72.00 %    Lymphocytes 22.90 22.00 - 41.00 %    Monocytes 3.70 0.00 - 13.40 %    Eosinophils 2.40 0.00 - 6.90 %    Basophils 0.30 0.00 - 1.80 %    Immature Granulocytes 0.30 0.00 - 0.90 %    Nucleated RBC 0.00 /100 WBC    Neutrophils (Absolute) 5.32 2.00 - 7.15 K/uL    Lymphs (Absolute) 1.73 1.00 - 4.80 K/uL    Monos (Absolute) 0.28 0.00 - 0.85 K/uL    Eos (Absolute) 0.18 0.00 - 0.51 K/uL    Baso (Absolute) 0.02 0.00 - 0.12 K/uL    Immature Granulocytes  (abs) 0.02 0.00 - 0.11 K/uL    NRBC (Absolute) 0.00 K/uL    Hepatitis B Surface Antigen Non-Reactive Non-Reactive    Rubella IgG Antibody 125.00 IU/mL    Syphilis, Treponemal Qual Non-Reactive Non-Reactive   HIV AG/AB COMBO ASSAY SCREENING    Collection Time: 10/07/20  9:25 AM   Result Value Ref Range    HIV Ag/Ab Combo Assay Non-Reactive Non Reactive   URINE DRUG SCREEN W/O CONF (AR)    Collection Time: 10/07/20  9:25 AM   Result Value Ref Range    Urine Amphetamine-Methamphetam Negative Cutoff 300 ng/mL    Barbiturates Negative Cutoff 200 ng/mL    Benzodiazepines Negative Cutoff 200 ng/mL    Propoxyphene Negative Cutoff 300 ng/mL    Cocaine Metabolite Negative Cutoff 150 ng/mL    Methadone Negative Cutoff 150 ng/mL    Codeine-Morphine Negative Cutoff 300 ng/mL    Phencyclidine -Pcp Negative Cutoff 25 ng/mL    Cannabinoid Metab Negative Cutoff 20 ng/mL    Oxycodone Negative Cutoff 100 ng/mL    MDMA (Ecstasy) Negative Cutoff 500 ng/mL    Drug Comment Urine Drugs See Note    OP Prenatal Panel-Blood Bank    Collection Time: 10/07/20  9:25 AM   Result Value Ref Range    ABO Grouping Only O     Rh Grouping Only POS     Antibody Screen Scrn NEG    URINE MICROSCOPIC (W/UA)    Collection Time: 10/07/20  9:25 AM   Result Value Ref Range    WBC 2-5 /hpf    RBC 2-5 (A) /hpf    Bacteria Many (A) None /hpf    Epithelial Cells Few /hpf    Hyaline Cast 3-5 (A) /lpf   FREE THYROXINE    Collection Time: 10/07/20  9:26 AM   Result Value Ref Range    Free T-4 1.17 0.93 - 1.70 ng/dL   TSH    Collection Time: 10/07/20  9:26 AM   Result Value Ref Range    TSH 1.470 0.380 - 5.330 uIU/mL   HEP C VIRUS ANTIBODY    Collection Time: 10/07/20  9:26 AM   Result Value Ref Range    Hepatitis C Antibody Non-Reactive Non-Reactive   CBC WITH DIFFERENTIAL    Collection Time: 11/03/20  2:02 PM   Result Value Ref Range    WBC 11.5 (H) 4.8 - 10.8 K/uL    RBC 4.13 (L) 4.20 - 5.40 M/uL    Hemoglobin 12.1 12.0 - 16.0 g/dL    Hematocrit 37.6 37.0 - 47.0 %     MCV 91.0 81.4 - 97.8 fL    MCH 29.3 27.0 - 33.0 pg    MCHC 32.2 (L) 33.6 - 35.0 g/dL    RDW 41.1 35.9 - 50.0 fL    Platelet Count 238 164 - 446 K/uL    MPV 11.4 9.0 - 12.9 fL    Neutrophils-Polys 86.30 (H) 44.00 - 72.00 %    Lymphocytes 11.00 (L) 22.00 - 41.00 %    Monocytes 2.00 0.00 - 13.40 %    Eosinophils 0.20 0.00 - 6.90 %    Basophils 0.20 0.00 - 1.80 %    Immature Granulocytes 0.30 0.00 - 0.90 %    Nucleated RBC 0.00 /100 WBC    Neutrophils (Absolute) 9.96 (H) 2.00 - 7.15 K/uL    Lymphs (Absolute) 1.27 1.00 - 4.80 K/uL    Monos (Absolute) 0.23 0.00 - 0.85 K/uL    Eos (Absolute) 0.02 0.00 - 0.51 K/uL    Baso (Absolute) 0.02 0.00 - 0.12 K/uL    Immature Granulocytes (abs) 0.04 0.00 - 0.11 K/uL    NRBC (Absolute) 0.00 K/uL   Comp Metabolic Panel    Collection Time: 11/03/20  2:02 PM   Result Value Ref Range    Sodium 134 (L) 135 - 145 mmol/L    Potassium 3.5 (L) 3.6 - 5.5 mmol/L    Chloride 104 96 - 112 mmol/L    Co2 21 20 - 33 mmol/L    Anion Gap 9.0 7.0 - 16.0    Glucose 75 65 - 99 mg/dL    Bun 5 (L) 8 - 22 mg/dL    Creatinine 0.48 (L) 0.50 - 1.40 mg/dL    Calcium 8.2 (L) 8.5 - 10.5 mg/dL    AST(SGOT) 12 12 - 45 U/L    ALT(SGPT) 13 2 - 50 U/L    Alkaline Phosphatase 100 (H) 30 - 99 U/L    Total Bilirubin 0.2 0.1 - 1.5 mg/dL    Albumin 3.0 (L) 3.2 - 4.9 g/dL    Total Protein 6.1 6.0 - 8.2 g/dL    Globulin 3.1 1.9 - 3.5 g/dL    A-G Ratio 1.0 g/dL   ESTIMATED GFR    Collection Time: 11/03/20  2:02 PM   Result Value Ref Range    GFR If African American >60 >60 mL/min/1.73 m 2    GFR If Non African American >60 >60 mL/min/1.73 m 2   CBC WITH DIFFERENTIAL    Collection Time: 11/29/20 12:07 PM   Result Value Ref Range    WBC 7.4 4.8 - 10.8 K/uL    RBC 4.53 4.20 - 5.40 M/uL    Hemoglobin 13.3 12.0 - 16.0 g/dL    Hematocrit 41.5 37.0 - 47.0 %    MCV 91.6 81.4 - 97.8 fL    MCH 29.4 27.0 - 33.0 pg    MCHC 32.0 (L) 33.6 - 35.0 g/dL    RDW 42.1 35.9 - 50.0 fL    Platelet Count 222 164 - 446 K/uL    MPV 11.2 9.0 - 12.9  fL    Neutrophils-Polys 74.20 (H) 44.00 - 72.00 %    Lymphocytes 19.90 (L) 22.00 - 41.00 %    Monocytes 4.80 0.00 - 13.40 %    Eosinophils 0.40 0.00 - 6.90 %    Basophils 0.30 0.00 - 1.80 %    Immature Granulocytes 0.40 0.00 - 0.90 %    Nucleated RBC 0.00 /100 WBC    Neutrophils (Absolute) 5.52 2.00 - 7.15 K/uL    Lymphs (Absolute) 1.48 1.00 - 4.80 K/uL    Monos (Absolute) 0.36 0.00 - 0.85 K/uL    Eos (Absolute) 0.03 0.00 - 0.51 K/uL    Baso (Absolute) 0.02 0.00 - 0.12 K/uL    Immature Granulocytes (abs) 0.03 0.00 - 0.11 K/uL    NRBC (Absolute) 0.00 K/uL   Comp Metabolic Panel    Collection Time: 11/29/20 12:07 PM   Result Value Ref Range    Sodium 135 135 - 145 mmol/L    Potassium 3.8 3.6 - 5.5 mmol/L    Chloride 104 96 - 112 mmol/L    Co2 22 20 - 33 mmol/L    Anion Gap 9.0 7.0 - 16.0    Glucose 75 65 - 99 mg/dL    Bun 7 (L) 8 - 22 mg/dL    Creatinine 0.53 0.50 - 1.40 mg/dL    Calcium 9.1 8.5 - 10.5 mg/dL    AST(SGOT) 32 12 - 45 U/L    ALT(SGPT) 26 2 - 50 U/L    Alkaline Phosphatase 183 (H) 30 - 99 U/L    Total Bilirubin 0.9 0.1 - 1.5 mg/dL    Albumin 3.6 3.2 - 4.9 g/dL    Total Protein 7.0 6.0 - 8.2 g/dL    Globulin 3.4 1.9 - 3.5 g/dL    A-G Ratio 1.1 g/dL   LIPASE    Collection Time: 11/29/20 12:07 PM   Result Value Ref Range    Lipase 44 11 - 82 U/L   AMYLASE    Collection Time: 11/29/20 12:07 PM   Result Value Ref Range    Amylase 68 20 - 103 U/L   ESTIMATED GFR    Collection Time: 11/29/20 12:07 PM   Result Value Ref Range    GFR If African American >60 >60 mL/min/1.73 m 2    GFR If Non African American >60 >60 mL/min/1.73 m 2   COVID/SARS CoV-2 PCR    Collection Time: 11/29/20  2:06 PM    Specimen: Nasopharyngeal; Respirate   Result Value Ref Range    COVID Order Status Received    SARS-COV Antigen MARIEL    Collection Time: 11/29/20  2:06 PM   Result Value Ref Range    SARS-CoV-2 Source Nasal Swab     SARS-COV ANTIGEN MARIEL NotDetected Not-Detected        Assessment:   Tita Ribera at 34w2d  Labor status:  Not in labor.  Right upper quadrant pain  Decreased fetal movement    Obstetrical history significant for   Patient Active Problem List    Diagnosis Date Noted   • Acid indigestion 2020   • ASCUS of cervix with negative high risk HPV 2020   • Supervision of other high risk pregnancy, antepartum 2020   • History of  delivery- desires repeat, unsure about tubal 2020   • Migraines 2020   .   EFW:   Plan:     Admit to L&D, observation  Fetal monitoring/toco    Pain control    Fetal monitoring    We will continue ursodiol, possibly add PPI/H2 blocker    We will order MRCP given worsening pain.    Nausea/vomiting control.  Will start Zofran, may need another antiemetic    Given advanced gestational age, cholecystectomy as well as ERCP will place her at high risk.    We will consult GI for recommendations.    If you are unable to control her pain and symptoms, may consider delivery at 37 weeks gestation, earlier if medical management fails or evidence of worse liver function/cholangitis/cholecystitis.

## 2020-11-30 NOTE — PROGRESS NOTES
MRI ordered; pt is 34 weeks pregnant.  Consent signed by patient and MD, to be scanned into pt's chart.  MRI completed.

## 2020-11-30 NOTE — PROGRESS NOTES
1900- received report from Chandrika NATION RN.   1945- assessment performed, wnl. no c/o lof, bleeding, uc's, or dfm. Discussed poc for pain and nausea medication, encouraged to eat when able with zofran on board, verbalized understanding.   0125- pt c/o nausea and dizziness, zofran given and encouraged to eat to help with dizziness, because of narcotics, snacks given.   0300- pt sleeping, no needs noted, will continue to monitor.  0620- scheduled am medications given (see mar), states no pain or discomfort at this time  0645- pt called out c/o back pain, morphine 2mg given ivp (see mar)  0700- report given to BERNADETTE Quintanilla

## 2020-11-30 NOTE — PROGRESS NOTES
0700-report received from SURI Lucero RN. Patient transport took pt to MRI. Pt reports pain 5/10 and does not want pain medication at this time.   0900-Dr Flowers updated on pt status. Pt to have labs redrawn.   1500-Dr Flowers at bedside to talk with pt and explain the ERCP procedure that she is scheduled for at 1330 tomorrow. Order received to give betamethasone.   1900-report given to SADAF Stern RN

## 2020-11-30 NOTE — CONSULTS
"Date of Consultation:  2020    Patient: : Tita Ribera  MRN: [id number]    Referring Physician:     GI:Ahsan Mitchell M.D.     Reason for Consultation:  RUQ abd pain       History of Present Illness:       reporting right upper quadrant pain, burning back pain and cramping since Friday. Reports nausea/vomiting for the past month. Reports blood tinged mucus this morning and reports she has been struggling with gallbladder problems this pregnancy. Also states decreased fetal movement since Friday.    She was seen in the GI clinic on 2020 by Sam Borrero  :    \"She reports RUQ pain that started in August which at that time was postprandial after meals within about 30 minutes. She reported hamburgers and other fried fatty foods causing right upper quadrant abdominal discomfort that could persist for 1-2 hours.   She has a gradually been having worsening symptoms with now ongoing pain that can will persist for several days with nausea and vomiting. She states that this has prevented her from gaining weight during her current pregnancy. She did change her diet and did not have any real improvement. She is now having RUQ pain that can persist for 3-4 days.     Typically she will have RUQ pain every 7 days in the epigastric region that radiates to her right flank. She describes a postprandial relationship with increased burping. She occasionally has heartburn.       She has tried Tylenol with slight improvement and when her RUQ pain becomes extreme she will take a Percocet. She reports nausea and vomiting now at least twice a week sometimes 3 or 4 times a day and often is unable to keep anything down on her bad days.     She is currently 33 weeks and planning on scheduling a  early due to her gallbladder plan, was told by her obstetrician that they will not perform a  prior to 39 weeks.     She denies NSAID use. She started famotidine twice a day and did not notice any improvement. " "    Previous to August she would eat hamburgers and pizza. She is no longer eating fried and fatty foods.    11/3/2020: Abdominal ultrasound liver normal in contour 14.43 cm, gallbladder with sludge with CBD 2.5 mm, MPV 0.68 cm    Most recent labs: 11/3/2020: CBC with WBC 11.5 with a neutrophil dominance, Hgb 12.1, HCT 37.6; sodium 134, potassium 3.5, AST 12, ALT 13,  albumin 3; HBV AB negative, HCV AB-, HIV negative\"         As per the pat the pain was intermittent initially, for last 3 days, its constant and intolearble          Past Medical History:   Diagnosis Date   • Migraines 5/14/2020         Past Surgical History:   Procedure Laterality Date   • PRIMARY C SECTION         Family History   Problem Relation Age of Onset   • Cancer Mother         Breast cancer   • No Known Problems Father    • No Known Problems Sister    • No Known Problems Brother        Social History     Socioeconomic History   • Marital status:      Spouse name: Not on file   • Number of children: Not on file   • Years of education: Not on file   • Highest education level: Not on file   Occupational History   • Not on file   Social Needs   • Financial resource strain: Not on file   • Food insecurity     Worry: Not on file     Inability: Not on file   • Transportation needs     Medical: Not on file     Non-medical: Not on file   Tobacco Use   • Smoking status: Never Smoker   • Smokeless tobacco: Never Used   Substance and Sexual Activity   • Alcohol use: Never     Frequency: Never   • Drug use: Never   • Sexual activity: Yes     Partners: Male     Comment: none   Lifestyle   • Physical activity     Days per week: Not on file     Minutes per session: Not on file   • Stress: Not on file   Relationships   • Social connections     Talks on phone: Not on file     Gets together: Not on file     Attends Yazidism service: Not on file     Active member of club or organization: Not on file     Attends meetings of clubs or organizations: " "Not on file     Relationship status: Not on file   • Intimate partner violence     Fear of current or ex partner: Not on file     Emotionally abused: Not on file     Physically abused: Not on file     Forced sexual activity: Not on file   Other Topics Concern   • Not on file   Social History Narrative   • Not on file       Review of Systems   Constitutional: Positive for malaise/fatigue and weight loss.   HENT: Negative.    Eyes: Negative.    Respiratory: Negative.    Cardiovascular: Negative.    Gastrointestinal: Positive for abdominal pain, nausea and vomiting.   Genitourinary: Negative.    Musculoskeletal: Negative.    Skin: Negative.    Neurological: Negative.    Endo/Heme/Allergies: Negative.    Psychiatric/Behavioral: Negative.          Physical Exam:  Vitals:    11/29/20 1113 11/29/20 1114 11/29/20 1413   BP: 113/72  109/66   Pulse: 85  85   Temp: 36.3 °C (97.3 °F)  36.6 °C (97.9 °F)   TempSrc: Temporal  Oral   Weight:  83 kg (183 lb)    Height:  1.549 m (5' 1\")        Physical Exam   Constitutional: She is oriented to person, place, and time and well-developed, well-nourished, and in no distress.   HENT:   Head: Normocephalic.   Eyes: Pupils are equal, round, and reactive to light. Conjunctivae are normal.   Neck: Normal range of motion. Neck supple.   Cardiovascular: Normal rate and regular rhythm.   Pulmonary/Chest: Effort normal and breath sounds normal.   Abdominal: Bowel sounds are normal.   Gravid uterus      Musculoskeletal:         General: Edema present.   Neurological: She is alert and oriented to person, place, and time.   Skin: Skin is warm and dry.         Labs:  Recent Labs     11/29/20  1207   WBC 7.4   RBC 4.53   HEMOGLOBIN 13.3   HEMATOCRIT 41.5   MCV 91.6   MCH 29.4   MCHC 32.0*   RDW 42.1   PLATELETCT 222   MPV 11.2     Recent Labs     11/29/20  1207   SODIUM 135   POTASSIUM 3.8   CHLORIDE 104   CO2 22   GLUCOSE 75   BUN 7*             Imaging:     MRCP-pending     Impressions:    RUQ abd " pain   Nausea and vomiting   pregnancy       Recommendations:    This is a 25-year-old lady who is currently at 34 weeks of pregnancy ( ), presents with symptoms suggestive of biliary colic/cholecystitis.she had ongoing intermittent symptoms since last August.  This has progressed and is continuously present at this point leading to chronic nausea and vomiting and inability to keep any food down leading to weight loss and dehydration.    -She is at a high risk for any kind of surgical intervention including ERCP due to being in third trimester, issues with the medication/anesthesia, issues of with the radiation and prone placement for ERCP  -We will await MRCP result  -Continue ursodiol/ increase acid suppression to highest dose twice daily ( Pepcid 40 mg twice daily )   -Continue Zofran  -I would suggest that she should be medically treated aggressively as being treated for hyperemesis/ may consider adding Reglan  -if medical management fails she may have to undergo delivery and possible cholecystectomy with Intra-Op cholangiogram.          This note was generated using voice recognition software which has a small chance of producing errors of grammar and possibly content. I have made every reasonable attempt to find and correct any obvious errors, but expect that some may not be found prior to finalization of this note.

## 2020-12-01 ENCOUNTER — APPOINTMENT (OUTPATIENT)
Dept: RADIOLOGY | Facility: MEDICAL CENTER | Age: 25
DRG: 833 | End: 2020-12-01
Attending: INTERNAL MEDICINE
Payer: MEDICAID

## 2020-12-01 ENCOUNTER — ANESTHESIA (OUTPATIENT)
Dept: SURGERY | Facility: MEDICAL CENTER | Age: 25
DRG: 833 | End: 2020-12-01
Payer: MEDICAID

## 2020-12-01 PROCEDURE — 700111 HCHG RX REV CODE 636 W/ 250 OVERRIDE (IP): Performed by: OBSTETRICS & GYNECOLOGY

## 2020-12-01 PROCEDURE — 160203 HCHG ENDO MINUTES - 1ST 30 MINS LEVEL 4: Performed by: INTERNAL MEDICINE

## 2020-12-01 PROCEDURE — 700105 HCHG RX REV CODE 258: Performed by: INTERNAL MEDICINE

## 2020-12-01 PROCEDURE — 500066 HCHG BITE BLOCK, ECT: Performed by: INTERNAL MEDICINE

## 2020-12-01 PROCEDURE — A9270 NON-COVERED ITEM OR SERVICE: HCPCS | Performed by: OBSTETRICS & GYNECOLOGY

## 2020-12-01 PROCEDURE — 110371 HCHG SHELL REV 272: Performed by: INTERNAL MEDICINE

## 2020-12-01 PROCEDURE — 700111 HCHG RX REV CODE 636 W/ 250 OVERRIDE (IP): Performed by: ANESTHESIOLOGY

## 2020-12-01 PROCEDURE — 700102 HCHG RX REV CODE 250 W/ 637 OVERRIDE(OP): Performed by: OBSTETRICS & GYNECOLOGY

## 2020-12-01 PROCEDURE — 700111 HCHG RX REV CODE 636 W/ 250 OVERRIDE (IP): Performed by: INTERNAL MEDICINE

## 2020-12-01 PROCEDURE — 99232 SBSQ HOSP IP/OBS MODERATE 35: CPT | Performed by: OBSTETRICS & GYNECOLOGY

## 2020-12-01 PROCEDURE — 160035 HCHG PACU - 1ST 60 MINS PHASE I: Performed by: INTERNAL MEDICINE

## 2020-12-01 PROCEDURE — 700117 HCHG RX CONTRAST REV CODE 255: Performed by: INTERNAL MEDICINE

## 2020-12-01 PROCEDURE — 160002 HCHG RECOVERY MINUTES (STAT): Performed by: INTERNAL MEDICINE

## 2020-12-01 PROCEDURE — 160208 HCHG ENDO MINUTES - EA ADDL 1 MIN LEVEL 4: Performed by: INTERNAL MEDICINE

## 2020-12-01 PROCEDURE — 302790 HCHG STAT ANTEPARTUM CARE, DAILY

## 2020-12-01 PROCEDURE — 700105 HCHG RX REV CODE 258: Performed by: OBSTETRICS & GYNECOLOGY

## 2020-12-01 PROCEDURE — 502240 HCHG MISC OR SUPPLY RC 0272: Performed by: INTERNAL MEDICINE

## 2020-12-01 PROCEDURE — 160036 HCHG PACU - EA ADDL 30 MINS PHASE I: Performed by: INTERNAL MEDICINE

## 2020-12-01 PROCEDURE — 770002 HCHG ROOM/CARE - OB PRIVATE (112)

## 2020-12-01 PROCEDURE — 0FC98ZZ EXTIRPATION OF MATTER FROM COMMON BILE DUCT, VIA NATURAL OR ARTIFICIAL OPENING ENDOSCOPIC: ICD-10-PCS | Performed by: INTERNAL MEDICINE

## 2020-12-01 PROCEDURE — 700101 HCHG RX REV CODE 250: Performed by: ANESTHESIOLOGY

## 2020-12-01 PROCEDURE — 160009 HCHG ANES TIME/MIN: Performed by: INTERNAL MEDICINE

## 2020-12-01 PROCEDURE — 700105 HCHG RX REV CODE 258: Performed by: ANESTHESIOLOGY

## 2020-12-01 PROCEDURE — 160048 HCHG OR STATISTICAL LEVEL 1-5: Performed by: INTERNAL MEDICINE

## 2020-12-01 RX ORDER — LIDOCAINE HYDROCHLORIDE 40 MG/ML
SOLUTION TOPICAL PRN
Status: DISCONTINUED | OUTPATIENT
Start: 2020-12-01 | End: 2020-12-01 | Stop reason: SURG

## 2020-12-01 RX ORDER — CEFAZOLIN SODIUM 1 G/3ML
INJECTION, POWDER, FOR SOLUTION INTRAMUSCULAR; INTRAVENOUS PRN
Status: DISCONTINUED | OUTPATIENT
Start: 2020-12-01 | End: 2020-12-01 | Stop reason: SURG

## 2020-12-01 RX ORDER — ONDANSETRON 2 MG/ML
INJECTION INTRAMUSCULAR; INTRAVENOUS PRN
Status: DISCONTINUED | OUTPATIENT
Start: 2020-12-01 | End: 2020-12-01 | Stop reason: SURG

## 2020-12-01 RX ORDER — ONDANSETRON 2 MG/ML
4 INJECTION INTRAMUSCULAR; INTRAVENOUS
Status: DISCONTINUED | OUTPATIENT
Start: 2020-12-01 | End: 2020-12-01 | Stop reason: HOSPADM

## 2020-12-01 RX ORDER — DIPHENHYDRAMINE HYDROCHLORIDE 50 MG/ML
12.5 INJECTION INTRAMUSCULAR; INTRAVENOUS
Status: DISCONTINUED | OUTPATIENT
Start: 2020-12-01 | End: 2020-12-01 | Stop reason: HOSPADM

## 2020-12-01 RX ORDER — DEXAMETHASONE SODIUM PHOSPHATE 4 MG/ML
INJECTION, SOLUTION INTRA-ARTICULAR; INTRALESIONAL; INTRAMUSCULAR; INTRAVENOUS; SOFT TISSUE PRN
Status: DISCONTINUED | OUTPATIENT
Start: 2020-12-01 | End: 2020-12-01 | Stop reason: SURG

## 2020-12-01 RX ORDER — SODIUM CHLORIDE, SODIUM LACTATE, POTASSIUM CHLORIDE, CALCIUM CHLORIDE 600; 310; 30; 20 MG/100ML; MG/100ML; MG/100ML; MG/100ML
1000 INJECTION, SOLUTION INTRAVENOUS
Status: COMPLETED | OUTPATIENT
Start: 2020-12-01 | End: 2020-12-02

## 2020-12-01 RX ORDER — OXYCODONE HCL 5 MG/5 ML
10 SOLUTION, ORAL ORAL
Status: DISCONTINUED | OUTPATIENT
Start: 2020-12-01 | End: 2020-12-01 | Stop reason: HOSPADM

## 2020-12-01 RX ORDER — OXYCODONE HCL 5 MG/5 ML
5 SOLUTION, ORAL ORAL
Status: DISCONTINUED | OUTPATIENT
Start: 2020-12-01 | End: 2020-12-01 | Stop reason: HOSPADM

## 2020-12-01 RX ORDER — SUCCINYLCHOLINE/SOD CL,ISO/PF 200MG/10ML
SYRINGE (ML) INTRAVENOUS PRN
Status: DISCONTINUED | OUTPATIENT
Start: 2020-12-01 | End: 2020-12-01 | Stop reason: SURG

## 2020-12-01 RX ADMIN — SODIUM CHLORIDE, POTASSIUM CHLORIDE, SODIUM LACTATE AND CALCIUM CHLORIDE: 600; 310; 30; 20 INJECTION, SOLUTION INTRAVENOUS at 02:14

## 2020-12-01 RX ADMIN — Medication 140 MG: at 14:01

## 2020-12-01 RX ADMIN — SODIUM CHLORIDE, POTASSIUM CHLORIDE, SODIUM LACTATE AND CALCIUM CHLORIDE 1000 ML: 600; 310; 30; 20 INJECTION, SOLUTION INTRAVENOUS at 15:30

## 2020-12-01 RX ADMIN — URSODIOL 300 MG: 300 CAPSULE ORAL at 18:19

## 2020-12-01 RX ADMIN — DEXAMETHASONE SODIUM PHOSPHATE 4 MG: 4 INJECTION, SOLUTION INTRA-ARTICULAR; INTRALESIONAL; INTRAMUSCULAR; INTRAVENOUS; SOFT TISSUE at 14:03

## 2020-12-01 RX ADMIN — MORPHINE SULFATE 2 MG: 4 INJECTION INTRAVENOUS at 09:25

## 2020-12-01 RX ADMIN — ACETAMINOPHEN 1000 MG: 500 TABLET ORAL at 06:17

## 2020-12-01 RX ADMIN — PROPOFOL 50 MG: 10 INJECTION, EMULSION INTRAVENOUS at 14:04

## 2020-12-01 RX ADMIN — CEFAZOLIN 2 G: 330 INJECTION, POWDER, FOR SOLUTION INTRAMUSCULAR; INTRAVENOUS at 14:01

## 2020-12-01 RX ADMIN — MORPHINE SULFATE 2 MG: 4 INJECTION INTRAVENOUS at 20:01

## 2020-12-01 RX ADMIN — PHENYLEPHRINE HYDROCHLORIDE 40 MCG/MIN: 10 INJECTION INTRAVENOUS at 14:00

## 2020-12-01 RX ADMIN — MORPHINE SULFATE 2 MG: 4 INJECTION INTRAVENOUS at 17:01

## 2020-12-01 RX ADMIN — ONDANSETRON 4 MG: 2 INJECTION INTRAMUSCULAR; INTRAVENOUS at 14:44

## 2020-12-01 RX ADMIN — LIDOCAINE HYDROCHLORIDE 4 ML: 40 SOLUTION TOPICAL at 14:02

## 2020-12-01 RX ADMIN — MORPHINE SULFATE 2 MG: 4 INJECTION INTRAVENOUS at 22:24

## 2020-12-01 RX ADMIN — MORPHINE SULFATE 2 MG: 4 INJECTION INTRAVENOUS at 01:15

## 2020-12-01 RX ADMIN — PROPOFOL 50 MG: 10 INJECTION, EMULSION INTRAVENOUS at 14:30

## 2020-12-01 RX ADMIN — ONDANSETRON 4 MG: 2 INJECTION INTRAMUSCULAR; INTRAVENOUS at 15:08

## 2020-12-01 RX ADMIN — PROPOFOL 200 MG: 10 INJECTION, EMULSION INTRAVENOUS at 14:01

## 2020-12-01 RX ADMIN — URSODIOL 300 MG: 300 CAPSULE ORAL at 06:18

## 2020-12-01 RX ADMIN — BETAMETHASONE SODIUM PHOSPHATE AND BETAMETHASONE ACETATE 12 MG: 3; 3 INJECTION, SUSPENSION INTRA-ARTICULAR; INTRALESIONAL; INTRAMUSCULAR at 17:03

## 2020-12-01 RX ADMIN — FENTANYL CITRATE 50 MCG: 50 INJECTION, SOLUTION INTRAMUSCULAR; INTRAVENOUS at 15:54

## 2020-12-01 RX ADMIN — FAMOTIDINE 20 MG: 10 INJECTION INTRAVENOUS at 18:20

## 2020-12-01 ASSESSMENT — PATIENT HEALTH QUESTIONNAIRE - PHQ9
1. LITTLE INTEREST OR PLEASURE IN DOING THINGS: NOT AT ALL
2. FEELING DOWN, DEPRESSED, IRRITABLE, OR HOPELESS: NOT AT ALL
SUM OF ALL RESPONSES TO PHQ9 QUESTIONS 1 AND 2: 0

## 2020-12-01 ASSESSMENT — PAIN DESCRIPTION - PAIN TYPE
TYPE: ACUTE PAIN

## 2020-12-01 ASSESSMENT — PAIN SCALES - GENERAL: PAIN_LEVEL: 4

## 2020-12-01 NOTE — ANESTHESIA TIME REPORT
Anesthesia Start and Stop Event Times     Date Time Event    12/1/2020 1340 Ready for Procedure     1355 Anesthesia Start     1457 Anesthesia Stop        Responsible Staff  12/01/20    Name Role Begin End    Kathia Oropeza M.D. Anesth 1355 1452        Preop Diagnosis (Free Text):  Pre-op Diagnosis     Right upper quadrant abdominal pain, nausea,  vomiting        Preop Diagnosis (Codes):    Post op Diagnosis  Choledocholithiasis  pregnant 34 weeks gestational age    Premium Reason  Non-Premium    Comments:

## 2020-12-01 NOTE — ANESTHESIA PREPROCEDURE EVALUATION
25F, pregnant at 34 weeks gestation, here for ERCP    No Known Allergies     Relevant Problems   ANESTHESIA (within normal limits)      PULMONARY (within normal limits)      CARDIAC   (+) Migraines      OB   (+) History of  delivery- desires repeat, unsure about tubal     Past Medical History:   Diagnosis Date   • Migraines 2020      Past Surgical History:   Procedure Laterality Date   • PRIMARY C SECTION        Vitals:    20 0421 20 0818 20 1223 20 1339   BP: 130/55 (!) 97/53 110/67 109/79   Pulse: 84 87 76 87   Resp: 18 18 18 16   Temp: 36.1 °C (97 °F) 36.2 °C (97.2 °F) 36.6 °C (97.8 °F) 36.7 °C (98 °F)   TempSrc: Temporal Temporal Temporal Temporal   Weight:       Height:         Physical Exam    Airway   Mallampati: I  TM distance: >3 FB  Neck ROM: full       Cardiovascular - normal exam     Dental - normal exam           Pulmonary - normal exam     Abdominal    Neurological - normal exam                 Anesthesia Plan    ASA 2       Plan - general       Airway plan will be ETT  (L&D nurse and tech present, plan for intra-op continuous fetal monitoring.  Equipment and personnel available for emergent .)    Plan Factors:   Patient was previously instructed to abstain from smoking on day of procedure.  Patient did not smoke on day of procedure.      Induction: intravenous and rapid sequence      Pertinent diagnostic labs and testing reviewed    Informed Consent:    Anesthetic plan and risks discussed with patient.    Use of blood products discussed with: patient whom consented to blood products.

## 2020-12-01 NOTE — PROGRESS NOTES
Report received from Mónica FLEMING. Pt to go down for ERCP in main OR.   1320 - Transport here to take pt down to Pre Op. RN notified Dr. Carrizales.   Pt into OR and put under anesthesia for procedure. OR prepped for C/S incase there is a need for C/S. Fetal monitor on pt, RN and Dr. Carrizales at beside through entire procedure and watching fetal monitor.   Procedure completed. Pt transferred into PACU. Baby monitored for 30 min in PACU per MD order.    1611 - Pt into Ante Room. Monitors placed. Pt having back pain in upper back and pain in right upper abd. Medication given per MAR.   1715 - Report given back to Mónica FLEMING.

## 2020-12-01 NOTE — CARE PLAN
Problem: Knowledge Deficit  Goal: Patient/Support Person demonstrates understanding regarding condition, prognosis and treatment needs during the pregnancy  Outcome: PROGRESSING AS EXPECTED  Note: Plan of care discussed with pt, pt aware of plan for surgery tomorrow and NPO at midnight.      Problem: Infection  Goal: Will remain free from infection  Outcome: PROGRESSING AS EXPECTED  Note: Hand hygiene discussed with pt, pt aware of staff/family members need to foam in and foam out.

## 2020-12-01 NOTE — PROGRESS NOTES
OB Progress Note      Subjective:   Patient reports feeling okay, still with right upper quadrant pain that can be severe at times.  Morphine does help with this.  Some nausea, not really tolerating anything p.o.  Reports babies moving, denies contractions, loss of fluid, vaginal bleeding.    Objective:   24hr VS:  Temp:  [36.1 °C (97 °F)-36.8 °C (98.2 °F)] 36.8 °C (98.2 °F)  Pulse:  [72-85] 82  Resp:  [17-18] 17  BP: ()/(55-75) 106/66    Gen: AAO, NAD  Abdomen: gravid, soft, tender to palpation in right upper quadrant without rebound or guarding  Ext: NT, tr edema    NST:  Indication: abdominal pain, gallstones  130/mod variability/+ accelerations/no decelerations  [place]: no ctx ctx      Meds:     Current Facility-Administered Medications:   •  famotidine (PEPCID) tablet 40 mg, 40 mg, Oral, BID, Rajan Escobedo M.D., 40 mg at 11/30/20 0907  •  acetaminophen (TYLENOL) tablet 1,000 mg, 1,000 mg, Oral, Q6HRS PRN, Kirsten Sanches M.D., 1,000 mg at 11/30/20 0908  •  betamethasone acetate-betamethasone sodium phosphate (CELESTONE) injection 12 mg, 12 mg, Intramuscular, Q24HR, Kirsten Sanches M.D., 12 mg at 11/30/20 1609  •  morphine (pf) 4 mg/mL injection 2 mg, 2 mg, Intravenous, Q HOUR PRN, Rajan Escobedo M.D., 2 mg at 11/30/20 1354  •  ondansetron (ZOFRAN) syringe/vial injection 4 mg, 4 mg, Intravenous, Q4HRS PRN, Rajan Escobedo M.D., 4 mg at 11/30/20 1536  •  lactated ringers infusion, , Intravenous, Continuous, Rajan Escobedo M.D., Last Rate: 125 mL/hr at 11/30/20 1001, New Bag at 11/30/20 1001  •  ursodiol (ACTIGALL) capsule 300 mg, 300 mg, Oral, TID, Rajan Escobedo M.D., 300 mg at 11/30/20 1156    Labs:  Results for HARLEY CROW (MRN [id number]) as of 11/30/2020 16:37   Ref. Range 11/30/2020 10:00   WBC Latest Ref Range: 4.8 - 10.8 K/uL 5.7   RBC Latest Ref Range: 4.20 - 5.40 M/uL 4.26   Hemoglobin Latest Ref Range: 12.0 - 16.0 g/dL 12.7   Hematocrit Latest Ref Range:  37.0 - 47.0 % 39.5   MCV Latest Ref Range: 81.4 - 97.8 fL 92.7   MCH Latest Ref Range: 27.0 - 33.0 pg 29.8   MCHC Latest Ref Range: 33.6 - 35.0 g/dL 32.2 (L)   RDW Latest Ref Range: 35.9 - 50.0 fL 43.2   Platelet Count Latest Ref Range: 164 - 446 K/uL 199   MPV Latest Ref Range: 9.0 - 12.9 fL 11.3   Results for HARLEY CROW (MRN [id number]) as of 2020 16:37   Ref. Range 2020 10:00   Sodium Latest Ref Range: 135 - 145 mmol/L 138   Potassium Latest Ref Range: 3.6 - 5.5 mmol/L 3.9   Chloride Latest Ref Range: 96 - 112 mmol/L 105   Co2 Latest Ref Range: 20 - 33 mmol/L 27   Anion Gap Latest Ref Range: 7.0 - 16.0  6.0 (L)   Glucose Latest Ref Range: 65 - 99 mg/dL 99   Bun Latest Ref Range: 8 - 22 mg/dL 6 (L)   Creatinine Latest Ref Range: 0.50 - 1.40 mg/dL 0.56   GFR If  Latest Ref Range: >60 mL/min/1.73 m 2 >60   GFR If Non  Latest Ref Range: >60 mL/min/1.73 m 2 >60   Calcium Latest Ref Range: 8.5 - 10.5 mg/dL 8.8   AST(SGOT) Latest Ref Range: 12 - 45 U/L 19   ALT(SGPT) Latest Ref Range: 2 - 50 U/L 23   Alkaline Phosphatase Latest Ref Range: 30 - 99 U/L 159 (H)   Total Bilirubin Latest Ref Range: 0.1 - 1.5 mg/dL 0.4   Albumin Latest Ref Range: 3.2 - 4.9 g/dL 3.2   Total Protein Latest Ref Range: 6.0 - 8.2 g/dL 6.3   Globulin Latest Ref Range: 1.9 - 3.5 g/dL 3.1   A-G Ratio Latest Units: g/dL 1.0   Magnesium Latest Ref Range: 1.5 - 2.5 mg/dL 1.7       A/P:  25 y.o.  @ 34w2d with choledocholethiasis, hx of c/s x1  - choledocholethiasis, no evidence of cholecystitis at this time.  Appreciate GI management.  Discussed with patient option to try ERCP with GI to remove the stone and keep her pregnant for now.  Likely will need cholecystectomy in the future postpartum.  Discussed the other option would be for delivery and plan in the near postpartum period for cholecystectomy.  Discussed that she is 34 weeks and I would recommend we try to keep her pregnant at least  several more weeks if possible secondary to risks of prematurity.  Discussed that most babies born at 34 weeks do very well, however would need a NICU stay and does have increased risk compared to higher gestational age.  Patient amenable to try ERCP.  Currently scheduled for tomorrow at 130.  Given her late gestational age, will do continuous fetal monitoring intraoperatively with a set up for emergency  delivery if necessary.  Discussed with patient that typically this is not necessary and that most of the time patients do well with general anesthesia in pregnancy.  If ERCP is successful and patient doing well would anticipate to continue pregnancy and to at least 37 weeks, discussed would still typically recommend 39-week repeat  unless additional indication arises earlier.   Continue morphine as needed pain control, zofran prn N/V; electrolytes normal this morning     -Single intrauterine pregnancy at 34 weeks 2 days   NST today as above, reactive per my read; continue NST every shift   Recommend betamethasone now in case patient does need to be delivered within the next week.    -Patient previously consented for permanent sterilization.  When I discussed this with her today, patient does report that she is not desire future childbearing however seems hesitant in her response.  We discussed the permanency of this procedure and the risk of regret, especially given her young age.  We will revisit.  I encouraged the patient not to proceed with permanent sterilization if she is not 100% certain of her decision for no future childbearing, even in the event of a future new partner or deaths in the family.    dispo: cont antepartum monitoring  NPO at midnight    Kirsten Sanches MD  [facility], Women's Health

## 2020-12-02 VITALS
HEART RATE: 80 BPM | SYSTOLIC BLOOD PRESSURE: 90 MMHG | DIASTOLIC BLOOD PRESSURE: 54 MMHG | WEIGHT: 183 LBS | HEIGHT: 61 IN | RESPIRATION RATE: 17 BRPM | OXYGEN SATURATION: 97 % | BODY MASS INDEX: 34.55 KG/M2 | TEMPERATURE: 97.6 F

## 2020-12-02 PROCEDURE — 700105 HCHG RX REV CODE 258: Performed by: OBSTETRICS & GYNECOLOGY

## 2020-12-02 PROCEDURE — 99238 HOSP IP/OBS DSCHRG MGMT 30/<: CPT | Mod: 25 | Performed by: OBSTETRICS & GYNECOLOGY

## 2020-12-02 PROCEDURE — 700111 HCHG RX REV CODE 636 W/ 250 OVERRIDE (IP): Performed by: OBSTETRICS & GYNECOLOGY

## 2020-12-02 PROCEDURE — A9270 NON-COVERED ITEM OR SERVICE: HCPCS | Performed by: OBSTETRICS & GYNECOLOGY

## 2020-12-02 PROCEDURE — 59025 FETAL NON-STRESS TEST: CPT

## 2020-12-02 PROCEDURE — 700111 HCHG RX REV CODE 636 W/ 250 OVERRIDE (IP): Performed by: INTERNAL MEDICINE

## 2020-12-02 PROCEDURE — 59025 FETAL NON-STRESS TEST: CPT | Mod: 26 | Performed by: OBSTETRICS & GYNECOLOGY

## 2020-12-02 PROCEDURE — 700102 HCHG RX REV CODE 250 W/ 637 OVERRIDE(OP): Performed by: OBSTETRICS & GYNECOLOGY

## 2020-12-02 RX ORDER — ACETAMINOPHEN 500 MG
1000 TABLET ORAL EVERY 6 HOURS PRN
Qty: 30 TAB | Refills: 0 | Status: SHIPPED | OUTPATIENT
Start: 2020-12-02 | End: 2021-02-05

## 2020-12-02 RX ORDER — OXYCODONE HYDROCHLORIDE 5 MG/1
5 TABLET ORAL EVERY 4 HOURS PRN
Status: DISCONTINUED | OUTPATIENT
Start: 2020-12-02 | End: 2020-12-02 | Stop reason: HOSPADM

## 2020-12-02 RX ORDER — DOCUSATE SODIUM 100 MG/1
100 CAPSULE, LIQUID FILLED ORAL 2 TIMES DAILY
Status: DISCONTINUED | OUTPATIENT
Start: 2020-12-02 | End: 2020-12-02 | Stop reason: HOSPADM

## 2020-12-02 RX ADMIN — URSODIOL 300 MG: 300 CAPSULE ORAL at 12:26

## 2020-12-02 RX ADMIN — URSODIOL 300 MG: 300 CAPSULE ORAL at 05:34

## 2020-12-02 RX ADMIN — FAMOTIDINE 40 MG: 10 INJECTION INTRAVENOUS at 05:34

## 2020-12-02 RX ADMIN — SODIUM CHLORIDE, POTASSIUM CHLORIDE, SODIUM LACTATE AND CALCIUM CHLORIDE: 600; 310; 30; 20 INJECTION, SOLUTION INTRAVENOUS at 01:45

## 2020-12-02 RX ADMIN — MORPHINE SULFATE 2 MG: 4 INJECTION INTRAVENOUS at 05:34

## 2020-12-02 RX ADMIN — DOCUSATE SODIUM 100 MG: 100 CAPSULE, LIQUID FILLED ORAL at 12:26

## 2020-12-02 NOTE — DISCHARGE SUMMARY
Antepartum Discharge Summary:    Tita Ribera   Admission date:   Admitting diagnosis: RUQ pain  Discharge Date:     Discharge Diagnosis: s/p ERCP for choledocholithiasis     Past Medical History:   Diagnosis Date   • Migraines 2020     OB History    Para Term  AB Living   3 2 2     2   SAB TAB Ectopic Molar Multiple Live Births             2      # Outcome Date GA Lbr Jose/2nd Weight Sex Delivery Anes PTL Lv   3 Current            2 Term 18 39w0d  3.714 kg (8 lb 3 oz) M CS-Unspec   KECIA      Birth Comments: Pt states having issues with her placenta, baby was also having respiration problems   1 Term 11/09/15 39w0d  3.204 kg (7 lb 1 oz) F Vag-Spont EPI  KECIA       Patient has no known allergies.  Patient Active Problem List    Diagnosis Date Noted   • Acid indigestion 2020   • ASCUS of cervix with negative high risk HPV 2020   • Supervision of other high risk pregnancy, antepartum 2020   • History of  delivery- desires repeat, unsure about tubal 2020   • Migraines 2020       Hospital Course:   25 y.o.  was admitted with unrelenting RUQ pain.  She was admitted for control of N/V and pain.  GI was consulted and on  patient underwent ERCP with removal of 6 small biliary calculi.  Fetal monitoring was reactive/reassuring throughout her stay and following procedure.  She was monitored overnight and she experienced resolution of her symptoms.  On day of discharge her physical exam is benign, she is tolerating PO and vital signs are within normal limits. She is discharged home in stable condition.  Patient without complaints today and desires discharge.     Vitals:    20 0400 20 0411 20 0807 20 1203   BP: 101/60 101/60 (!) 98/59 (!) 90/54   Pulse: 76 76 85 80   Resp: 18  17 17   Temp: 36.3 °C (97.3 °F)  36.3 °C (97.3 °F) 36.4 °C (97.6 °F)   TempSrc: Temporal  Temporal Temporal   SpO2:       Weight:        Height:         Exam:  Gen: NAD  Psych: A&Ox3  Abdomen: soft, gravid, nontender  no cyanosis, clubbing    Tita Ribera, santy  at 34w4d with an NIRAV of 2021, by Last Menstrual Period, was seen at [facility] for a nonstress test.    Nonstress Test  Reason for NST: Antepartum  Variability: Moderate  Decelerations: None  Accelerations: Yes  Acoustic Stimulator: No  Baseline: 135  Uterine Irritability: No  Contractions: Not present  Nonstress Test Interpretation  $ Nonstress Test Interpretation - Fetus A: Reactive  REACTIVE NST PER MY READ - Dr. Lorenzana    Labs:  Lab Results   Component Value Date/Time    WBC 5.7 2020 10:00 AM    RBC 4.26 2020 10:00 AM    HEMOGLOBIN 12.7 2020 10:00 AM    HEMATOCRIT 39.5 2020 10:00 AM    MCV 92.7 2020 10:00 AM    MCH 29.8 2020 10:00 AM    MCHC 32.2 (L) 2020 10:00 AM    MPV 11.3 2020 10:00 AM        DISCHARGE MEDICATIONS:  Patient already has following meds which she will continue taking  No current facility-administered medications on file prior to encounter.      Current Outpatient Medications on File Prior to Encounter   Medication Sig Dispense Refill   • ursodiol (ACTIGALL) 300 MG Cap Take 300 mg by mouth 2 times a day.     • Prenatal MV-Min-Fe Fum-FA-DHA (PRENATAL 1 PO) Take  by mouth.     In addition to tylenol 1000mg and Oxycodone 5mg tab PRN q 4-6 hours for pain (has these meds in her purse - not new rx's)       DISPOSITION AND RECOMMENDATIONS:  1. Diet: Full, well-rounded, healthy diet.   2. Activity normal  3.  Follow up for routine prenatal visit tomorrow  4.  Return precuations for worsening RUQ pain, PTL, etc discussed    DEVORAH

## 2020-12-02 NOTE — ANESTHESIA POSTPROCEDURE EVALUATION
Patient: Tita Ribera    Procedure Summary     Date: 12/01/20 Room / Location: [facility] 07 / SURGERY [facility]    Anesthesia Start: 1355 Anesthesia Stop: 1457    Procedure: ERCP (ENDOSCOPIC RETROGRADE CHOLANGIOPANCREATOGRAPHY) (N/A Esophagus) Diagnosis: (CHOLEDOCHOLITHIASIS)    Surgeons: Ahsan Mitchell M.D. Responsible Provider: Kathia Oropeza M.D.    Anesthesia Type: general ASA Status: 2          Final Anesthesia Type: general  Last vitals  BP   Blood Pressure: 102/61, NIBP: 111/76    Temp   36.7 °C (98 °F)    Pulse   Pulse: 81   Resp   18    SpO2   96 %      Anesthesia Post Evaluation    Patient location during evaluation: PACU  Patient participation: complete - patient participated  Level of consciousness: awake and alert  Pain score: 4    Airway patency: patent  Anesthetic complications: no  Cardiovascular status: hemodynamically stable  Respiratory status: acceptable  Hydration status: euvolemic    PONV: none

## 2020-12-02 NOTE — OR NURSING
Transported by PACU CNA back to her room.Pt. was seen by Dr. Dennison in st.1.Medicated for C/O mid. Back and flank pain with good relief.Pt's. FHT and beats was monitored by L&D R.N. for 30 min. In PACU and all WNL.

## 2020-12-02 NOTE — OP REPORT
"Date of Procedure: 2020   5:49 PM      Referring Physicians:   1. Kirsten Sanches M.D.    Procedure performed:  1.ERCP  with sphincterotomy  2.ERCP with removal of biliary calculi  ====================================  Anesthesiologist:  Kathia Oropeza M.D.  Surgeon: Ahsan Mitchell M.D.     -----------------------------------------------------------------  Preoperative diagnosis:   1. Abnormal Imaging of the abdomen   2. Elevated LFTs   3. Choledocholithiasis  4. Pregnancy  5.   Postoperative diagnosis:  1. Dilated biliary tree  2.  Choledocholithiasis  ------------------------------------------------------------------    Impression:    Biliary cholangiogram:  The intrahepatic ducts are not  dilated  The left and right main hepatic ducts are nondilated  The common hepatic duct measures 13 mm in size   The CBD measures 10 mm in size  1 filling defect was seen in the CHD    -S/p  Removal of 6 small biliary calculi  -Free flow of bile seen    Recommendations:  1. LR for a total of 2 L  2. Pepcid 40 mg IV/ PO  twice daily for 5 days  3. Clear liquids today- advance in a.m. as tolerated to regular diet      ====================================  Indication:      reporting right upper quadrant pain, burning back pain and cramping since Friday. Reports nausea/vomiting for the past month. Reports blood tinged mucus this morning and reports she has been struggling with gallbladder problems this pregnancy. Also states decreased fetal movement since Friday.     She was seen in the GI clinic on 2020 by Sam Borrero  :     \"She reports RUQ pain that started in August which at that time was postprandial after meals within about 30 minutes. She reported hamburgers and other fried fatty foods causing right upper quadrant abdominal discomfort that could persist for 1-2 hours.   She has a gradually been having worsening symptoms with now ongoing pain that can will persist for several days with nausea and vomiting. " "She states that this has prevented her from gaining weight during her current pregnancy. She did change her diet and did not have any real improvement. She is now having RUQ pain that can persist for 3-4 days.      Typically she will have RUQ pain every 7 days in the epigastric region that radiates to her right flank. She describes a postprandial relationship with increased burping. She occasionally has heartburn.         She has tried Tylenol with slight improvement and when her RUQ pain becomes extreme she will take a Percocet. She reports nausea and vomiting now at least twice a week sometimes 3 or 4 times a day and often is unable to keep anything down on her bad days.      She is currently 33 weeks and planning on scheduling a  early due to her gallbladder plan, was told by her obstetrician that they will not perform a  prior to 39 weeks.      She denies NSAID use. She started famotidine twice a day and did not notice any improvement.     Previous to August she would eat hamburgers and pizza. She is no longer eating fried and fatty foods.    11/3/2020: Abdominal ultrasound liver normal in contour 14.43 cm, gallbladder with sludge with CBD 2.5 mm, MPV 0.68 cm    Most recent labs: 11/3/2020: CBC with WBC 11.5 with a neutrophil dominance, Hgb 12.1, HCT 37.6; sodium 134, potassium 3.5, AST 12, ALT 13,  albumin 3; HBV AB negative, HCV AB-, HIV negative\"            As per the pat the pain was intermittent initially, for last 3 days, its constant and intolearble   -------------------------    Medications:  The patient was performed under general anesthesia with monitored anesthesia care.  Propofol sedation was administered under discretion of anesthesiology.  Please refer to their notes for more details.     Universal Precautions:  [place] Precautions were followed throughout the entire procedure.  The patient was continuously monitored on Blood pressure, pulse oximetry, and heart rate " monitoring though out the procedure and afterwards. A time out was taken prior to the start of the procedure to identify the patient's name, date of birth, ID badge, and appropriate procedure site/procedure type.    Informed Consent:  Informed consent was obtained from the patient after the risks, benefits, and indications were explained to the patient in detail.  The alternatives to the procedure were explained as well.  Risks such as bleeding, infection, perforation, complications with sedation, and need for open surgery for complications were explained in detail prior to obtaining consent.  The patient was given opportunity to ask questions.  If need be these same risks, benefits, indication, and complications were explained to the patients family member prior to obtaining consent for the procedure.    Description of procedure:    Patient was brought to ERCP fluoro/operating room and met by staff and physicians.  After procedure was confirmed and appropriate procedure was identified preparations were made for sedation.  A time out was taken to identify the patients name, procedure site/type, and ID badge.    The patient was then sedated per anesthesiology and intubated.  The patient was then placed on the fluoroscopy table on their abdomen.    An oral bite block was placed between the incisors.  When the patient was appropriately sedated the GIF Olympus side viewing Duodenal/ERCP endoscope was used to perform the procedure.    The Side viewing Duodenal/ERCP endoscope was passed through the oropharynx to the second duodenum and positioned to the ampulla.    Esophagus:    There were limited views of the esophagus using side viewing endoscope - these were normal in appearance.     Stomach:    Careful examination was made of the body and antrum of the stomach -using side viewing endoscope - these were normal in appearance.    Retroflexed views and photographs were obtained of the Fundus and cardia as well using side  viewing endoscope - these were normal in appearance.   Duodenum:  The endoscope was passed through the pylorus into the duodenal bulb which was examined using side viewing endoscope - this was normal in appearance.    Further advancement allowed for examination of the 1st and 2nd portion of the duodenum.    A  film was captured    Views of the Ampulla were obtained and endoscopic photographs were captured.  The AMPULLA was normal in appearance.    Selective cannulation of the biliary system was performed using a balloon Retrieval catheter.  We were able to access the biliary system on first attempt at cannulation. Contrast was injected  A guidewire was advanced in to the biliary system. A .035 wire was used.  ---  Pancreatogram:  Not obtained. Pancreatic system intentionally not cannulated as it was not indicated.  ---  Biliary cholangiogram:  The intrahepatic ducts are dilated  The left and right main hepatic ducts are nondilated  The common hepatic duct measures 13 mm in size   The CBD measures 10 mm in size  1 filling defect was seen in the CHD    Multiple fluoroscopic pictures were obtained to evaluate the bilary system.  After cholangiogram was obtained a guidewire was advanced to the intrahepatic ducts and maintained in position.     A controlled biliary SPHINCTEROTOMY was performed using pure endocut electrocautery with the ERBE electrocautery unit and sphincterotome    ----  The guidewire was maintained in the intrahepatic system.  The sphincterotome was removed.  The retrieval balloon catheter was placed over the guidewire and into the ductal system    A 9-12mm balloon retrieval catheter was advanced to the bifurcation and inflated to 12m. An occlusion cholangiogram was obtained.  Balloon sweeps were performed of the CBD and the balloon catheter was used to remove stones/sludge from the biliary tree:    6 small biliary calculi were removed    ---  All sludge and biliary stones were removed from the  bile ducts and the biliary system was cleared.   ------------  The endoscope was moved back to the stomach.  The insufflated air and bile products were suctioned.  The endoscope was slowly withdrawn.  No additional findings.  No acute complications.  The patient was extubated and taken to  PACU for recovery.

## 2020-12-02 NOTE — CARE PLAN
Problem: Safety  Goal: Free from accidental injury  Outcome: PROGRESSING AS EXPECTED  Goal: Free from intentional harm  Outcome: PROGRESSING AS EXPECTED  Goal: Free from self harm  Outcome: PROGRESSING AS EXPECTED     Problem: Nutrition Deficit  Goal: Patient will verbalize understanding of individual dietary needs  Outcome: PROGRESSING AS EXPECTED

## 2020-12-02 NOTE — DISCHARGE INSTRUCTIONS
General Instructions:  · If you think you are in labor, time contractions (lying on your left side) from the beginning of one contraction to the beginning of the next contraction for at least one hour.  · Increase fluid intake: you should consume 10-12 8 oz glasses of non-caffeinated fluid per day.  · Report any pressure or burning on urination to your physician.  · Monitor fetal movement: If you notice an absence or decrease in fetal movement, drink a large glass of water and rest on your side.  If there is no increase in movement, call your physician or go to the hospital for further evaluation.  · Report any sudden, sharp abdominal pain.  · Report any bleeding.  Spotting or pinkish discharge is normal after vaginal exam.  You may also spot after sexual intercourse.    Pre-term Labor (<37 weeks):  Call your physician or return to the hospital if:  · You have painless regular contractions more than 4 in one hour.  · Your water breaks (remember time and color).  · You have menstrual-like cramps, a low dull backache or pressure in your pelvis or back.  · Your baby does not move enough to complete the daily kick count (10 movements in 2 hours).  · Your baby moves much less often than on the days before or you have not felt your baby move all day.  · Please review the MEDICATION LIST section of your AFTER VISIT SUMMARY document.  · Take your medication as prescribed        Gallbladder Eating Plan  If you have a gallbladder condition, you may have trouble digesting fats. Eating a low-fat diet can help reduce your symptoms, and may be helpful before and after having surgery to remove your gallbladder (cholecystectomy). Your health care provider may recommend that you work with a diet and nutrition specialist (dietitian) to help you reduce the amount of fat in your diet.  What are tips for following this plan?  General guidelines  · Limit your fat intake to less than 30% of your total daily calories. If you eat around  1,800 calories each day, this is less than 60 grams (g) of fat per day.  · Fat is an important part of a healthy diet. Eating a low-fat diet can make it hard to maintain a healthy body weight. Ask your dietitian how much fat, calories, and other nutrients you need each day.  · Eat small, frequent meals throughout the day instead of three large meals.  · Drink at least 8-10 cups of fluid a day. Drink enough fluid to keep your urine clear or pale yellow.  · Limit alcohol intake to no more than 1 drink a day for nonpregnant women and 2 drinks a day for men. One drink equals 12 oz of beer, 5 oz of wine, or 1½ oz of hard liquor.  Reading food labels  · Check Nutrition Facts on food labels for the amount of fat per serving. Choose foods with less than 3 grams of fat per serving.  Shopping  · Choose nonfat and low-fat healthy foods. Look for the words “nonfat,” “low fat,” or “fat free.”  · Avoid buying processed or prepackaged foods.  Cooking  · Cook using low-fat methods, such as baking, broiling, grilling, or boiling.  · Cook with small amounts of healthy fats, such as olive oil, grapeseed oil, canola oil, or sunflower oil.  What foods are recommended?    · All fresh, frozen, or canned fruits and vegetables.  · Whole grains.  · Low-fat or non-fat (skim) milk and yogurt.  · Lean meat, skinless poultry, fish, eggs, and beans.  · Low-fat protein supplement powders or drinks.  · Spices and herbs.  What foods are not recommended?  · High-fat foods. These include baked goods, fast food, fatty cuts of meat, ice cream, french toast, sweet rolls, pizza, cheese bread, foods covered with butter, creamy sauces, or cheese.  · Fried foods. These include french fries, tempura, battered fish, breaded chicken, fried breads, and sweets.  · Foods with strong odors.  · Foods that cause bloating and gas.  Summary  · A low-fat diet can be helpful if you have a gallbladder condition, or before and after gallbladder surgery.  · Limit your fat  intake to less than 30% of your total daily calories. This is about 60 g of fat if you eat 1,800 calories each day.  · Eat small, frequent meals throughout the day instead of three large meals.  This information is not intended to replace advice given to you by your health care provider. Make sure you discuss any questions you have with your health care provider.  Document Released: 12/23/2014 Document Revised: 04/09/2020 Document Reviewed: 01/25/2018  Elsevier Patient Education © 2020 [employer]        Other Instructions:  Please carefully review your entire AFTER VISIT SUMMARY document for all discharge instructions.

## 2020-12-02 NOTE — PROGRESS NOTES
1900: report received from Mónica FLEMING. POC discussed care assumed    2200: pt has c/o of stronger contraction pain, contractions palpated mild.TOCO applied    2216: Dr Grier made aware    2235: pt stated after voiding, contractions no longer strong     0700: Report given to Mica FLEMING

## 2020-12-02 NOTE — PROGRESS NOTES
DenaJIL Ribera   34w4d  Admission DX: Pregnancy  Labor and delivery, indication for care  Admitted for Abdominal pain, nausea and vomitting    Date of Admission: 2020  Patient Active Problem List    Diagnosis Date Noted   • Acid indigestion 2020   • ASCUS of cervix with negative high risk HPV 2020   • Supervision of other high risk pregnancy, antepartum 2020   • History of  delivery- desires repeat, unsure about tubal 2020   • Migraines 2020       Subjective: Persistent nausea, pain is not improved.  Nervous about the procedure but feels if she does not have it done she will want to be delivered to get the procedure done as soon as possible.  Discussed the rare situation of losing the pregnancy and the patient stated she would not want her tubes tied in that event.  Understands will not get a tubal given her hesitation to this question.    uterine contractions:no  pain: .yes  LOF: no  vaginal Bleeding: no  fetal movement: normal    Objective:   Vitals:    20 1711 20 2000 20 2352 20 0000   BP: 102/61 100/58 101/61 101/61   Pulse: 81 74 67 67   Resp:  18  18   Temp:  36.1 °C (97 °F)  36.1 °C (96.9 °F)   TempSrc:  Temporal  Temporal   SpO2:  97%     Weight:       Height:         FHT cat I  [place] intermittent ctx  Gen: NAD, uncomfortable appearing  Abdomen: gravid, soft, tender to palpation in RUQ  Ext: SCDs on, Nt, no cyanosis or clubbing    Meds:     Current Facility-Administered Medications:   •  famotidine (PEPCID) injection 40 mg, 40 mg, Intravenous, BID, Ahsan Mitchell M.D., 20 mg at 20 1820  •  acetaminophen (TYLENOL) tablet 1,000 mg, 1,000 mg, Oral, Q6HRS PRN, Kirsten Sanches M.D., 1,000 mg at 20 0617  •  morphine (pf) 4 mg/mL injection 2 mg, 2 mg, Intravenous, Q HOUR PRN, Rajan Escobedo M.D., 2 mg at 20 2224  •  ondansetron (ZOFRAN) syringe/vial injection 4 mg, 4 mg, Intravenous, Q4HRS PRN, Rajan Escobedo,  M.D., 4 mg at 12/01/20 1508  •  lactated ringers infusion, , Intravenous, Continuous, Rajan Escobedo M.D., Stopped at 12/01/20 1451  •  ursodiol (ACTIGALL) capsule 300 mg, 300 mg, Oral, TID, Rajan Escobedo M.D., 300 mg at 12/01/20 1819    Lab:   Recent Results (from the past 72 hour(s))   CBC WITH DIFFERENTIAL    Collection Time: 11/29/20 12:07 PM   Result Value Ref Range    WBC 7.4 4.8 - 10.8 K/uL    RBC 4.53 4.20 - 5.40 M/uL    Hemoglobin 13.3 12.0 - 16.0 g/dL    Hematocrit 41.5 37.0 - 47.0 %    MCV 91.6 81.4 - 97.8 fL    MCH 29.4 27.0 - 33.0 pg    MCHC 32.0 (L) 33.6 - 35.0 g/dL    RDW 42.1 35.9 - 50.0 fL    Platelet Count 222 164 - 446 K/uL    MPV 11.2 9.0 - 12.9 fL    Neutrophils-Polys 74.20 (H) 44.00 - 72.00 %    Lymphocytes 19.90 (L) 22.00 - 41.00 %    Monocytes 4.80 0.00 - 13.40 %    Eosinophils 0.40 0.00 - 6.90 %    Basophils 0.30 0.00 - 1.80 %    Immature Granulocytes 0.40 0.00 - 0.90 %    Nucleated RBC 0.00 /100 WBC    Neutrophils (Absolute) 5.52 2.00 - 7.15 K/uL    Lymphs (Absolute) 1.48 1.00 - 4.80 K/uL    Monos (Absolute) 0.36 0.00 - 0.85 K/uL    Eos (Absolute) 0.03 0.00 - 0.51 K/uL    Baso (Absolute) 0.02 0.00 - 0.12 K/uL    Immature Granulocytes (abs) 0.03 0.00 - 0.11 K/uL    NRBC (Absolute) 0.00 K/uL   Comp Metabolic Panel    Collection Time: 11/29/20 12:07 PM   Result Value Ref Range    Sodium 135 135 - 145 mmol/L    Potassium 3.8 3.6 - 5.5 mmol/L    Chloride 104 96 - 112 mmol/L    Co2 22 20 - 33 mmol/L    Anion Gap 9.0 7.0 - 16.0    Glucose 75 65 - 99 mg/dL    Bun 7 (L) 8 - 22 mg/dL    Creatinine 0.53 0.50 - 1.40 mg/dL    Calcium 9.1 8.5 - 10.5 mg/dL    AST(SGOT) 32 12 - 45 U/L    ALT(SGPT) 26 2 - 50 U/L    Alkaline Phosphatase 183 (H) 30 - 99 U/L    Total Bilirubin 0.9 0.1 - 1.5 mg/dL    Albumin 3.6 3.2 - 4.9 g/dL    Total Protein 7.0 6.0 - 8.2 g/dL    Globulin 3.4 1.9 - 3.5 g/dL    A-G Ratio 1.1 g/dL   LIPASE    Collection Time: 11/29/20 12:07 PM   Result Value Ref Range    Lipase 44 11  - 82 U/L   AMYLASE    Collection Time: 11/29/20 12:07 PM   Result Value Ref Range    Amylase 68 20 - 103 U/L   ESTIMATED GFR    Collection Time: 11/29/20 12:07 PM   Result Value Ref Range    GFR If African American >60 >60 mL/min/1.73 m 2    GFR If Non African American >60 >60 mL/min/1.73 m 2   COVID/SARS CoV-2 PCR    Collection Time: 11/29/20  2:06 PM    Specimen: Nasopharyngeal; Respirate   Result Value Ref Range    COVID Order Status Received    SARS-COV Antigen MARIEL    Collection Time: 11/29/20  2:06 PM   Result Value Ref Range    SARS-CoV-2 Source Nasal Swab     SARS-COV ANTIGEN MARIEL NotDetected Not-Detected   Comp Metabolic Panel    Collection Time: 11/30/20 10:00 AM   Result Value Ref Range    Sodium 138 135 - 145 mmol/L    Potassium 3.9 3.6 - 5.5 mmol/L    Chloride 105 96 - 112 mmol/L    Co2 27 20 - 33 mmol/L    Anion Gap 6.0 (L) 7.0 - 16.0    Glucose 99 65 - 99 mg/dL    Bun 6 (L) 8 - 22 mg/dL    Creatinine 0.56 0.50 - 1.40 mg/dL    Calcium 8.8 8.5 - 10.5 mg/dL    AST(SGOT) 19 12 - 45 U/L    ALT(SGPT) 23 2 - 50 U/L    Alkaline Phosphatase 159 (H) 30 - 99 U/L    Total Bilirubin 0.4 0.1 - 1.5 mg/dL    Albumin 3.2 3.2 - 4.9 g/dL    Total Protein 6.3 6.0 - 8.2 g/dL    Globulin 3.1 1.9 - 3.5 g/dL    A-G Ratio 1.0 g/dL   MAGNESIUM    Collection Time: 11/30/20 10:00 AM   Result Value Ref Range    Magnesium 1.7 1.5 - 2.5 mg/dL   CBC WITH DIFFERENTIAL    Collection Time: 11/30/20 10:00 AM   Result Value Ref Range    WBC 5.7 4.8 - 10.8 K/uL    RBC 4.26 4.20 - 5.40 M/uL    Hemoglobin 12.7 12.0 - 16.0 g/dL    Hematocrit 39.5 37.0 - 47.0 %    MCV 92.7 81.4 - 97.8 fL    MCH 29.8 27.0 - 33.0 pg    MCHC 32.2 (L) 33.6 - 35.0 g/dL    RDW 43.2 35.9 - 50.0 fL    Platelet Count 199 164 - 446 K/uL    MPV 11.3 9.0 - 12.9 fL    Neutrophils-Polys 66.30 44.00 - 72.00 %    Lymphocytes 27.60 22.00 - 41.00 %    Monocytes 4.90 0.00 - 13.40 %    Eosinophils 0.70 0.00 - 6.90 %    Basophils 0.20 0.00 - 1.80 %    Immature Granulocytes 0.30  0.00 - 0.90 %    Nucleated RBC 0.00 /100 WBC    Neutrophils (Absolute) 3.79 2.00 - 7.15 K/uL    Lymphs (Absolute) 1.58 1.00 - 4.80 K/uL    Monos (Absolute) 0.28 0.00 - 0.85 K/uL    Eos (Absolute) 0.04 0.00 - 0.51 K/uL    Baso (Absolute) 0.01 0.00 - 0.12 K/uL    Immature Granulocytes (abs) 0.02 0.00 - 0.11 K/uL    NRBC (Absolute) 0.00 K/uL   ESTIMATED GFR    Collection Time: 20 10:00 AM   Result Value Ref Range    GFR If African American >60 >60 mL/min/1.73 m 2    GFR If Non African American >60 >60 mL/min/1.73 m 2       A/P:  25 y.o.  @ 34w4d with symptomatic choledocholethiasis  Pregnancy  Labor and delivery, indication for care  - symptomatic stones  - elevated LFTs  - fetal monitoring: continuous during procedure and qshift after recover  - GBS not done    dispo: ERCP today with Dr. Mitchell.  No NSAIDs due to gestational age. Monitor inpatient after procedure.     Serena Carrizales D.O.  [facility], Women's Health

## 2020-12-04 ENCOUNTER — ROUTINE PRENATAL (OUTPATIENT)
Dept: OBGYN | Facility: CLINIC | Age: 25
End: 2020-12-04

## 2020-12-04 VITALS — DIASTOLIC BLOOD PRESSURE: 60 MMHG | WEIGHT: 179.6 LBS | BODY MASS INDEX: 33.94 KG/M2 | SYSTOLIC BLOOD PRESSURE: 104 MMHG

## 2020-12-04 DIAGNOSIS — Z98.891 HISTORY OF CESAREAN DELIVERY: ICD-10-CM

## 2020-12-04 PROCEDURE — 90040 PR PRENATAL FOLLOW UP: CPT | Performed by: OBSTETRICS & GYNECOLOGY

## 2020-12-04 RX ORDER — OXYCODONE HYDROCHLORIDE 5 MG/1
5 TABLET ORAL EVERY 4 HOURS PRN
COMMUNITY

## 2020-12-04 ASSESSMENT — FIBROSIS 4 INDEX: FIB4 SCORE: 0.48

## 2020-12-04 NOTE — PROGRESS NOTES
Pt. Here for OB/FU. Reports Good FM.   Good # [phone]  Pt. Denies VB, LOF, or UC's.   Pharmacy verified.   Chaperone offered and not indicated  Pt states that she has cramping. Pt states that she was also having some vaginal bleeding on Sunday morning. She also states that she was having some contractions on 12/01/2020

## 2020-12-04 NOTE — PROGRESS NOTES
S: Pt presents to [facility] mode of delivery.  She was previously counseled at 18 weeks about mode of delivery and was initially unsure.  Recently decided she would like repeat c/s without tubal ligation. Considering LARC  Having occasional cramping without bleeding or leaking.  No contractions, vaginal bleeding, or leaking fluids. Good fetal movement.    O: /60   Wt 81.5 kg (179 lb 9.6 oz)   LMP 2020 (Exact Date)   BMI 33.94 kg/m²   Vitals:    20 1430   BP: 104/60   Weight: 81.5 kg (179 lb 9.6 oz)     Vitals, fundal height , fetal position, and FHR reviewed on flowsheet    Patient Active Problem List   Diagnosis   • Migraines   • Supervision of other high risk pregnancy, antepartum   • History of  delivery- desires repeat, unsure about tubal   • ASCUS of cervix with negative high risk HPV   • Acid indigestion     Lab:  Recent Labs     10/07/20  0925 10/07/20  0926   ABOGROUP O  --    RUBELLAIGG 125.00  --    HEPBSAG Non-Reactive  --    HEPCAB  --  Non-Reactive   TSHULTRASEN  --  1.470   FREET4  --  1.17       A/P:  25 y.o.  at 34w6d presents for routine obstetric follow-up.     #Prenatal care  - Continue prenatal vitamins.  GCCT neg, pap: ASCUS w negative HPV-> normal testing  TOLAC counseling performed at 18wks and repeat at 34wks: repeat c/s NO BTL.   Biliary colic - needs to fu with GI as outpatient. S/p ERCP on  but will need lap usman postpartum     - SVE: closed today.   - PTL precautions given.   - Follow-up: 2 weeks.

## 2020-12-22 ENCOUNTER — HOSPITAL ENCOUNTER (OUTPATIENT)
Facility: MEDICAL CENTER | Age: 25
End: 2020-12-22
Attending: OBSTETRICS & GYNECOLOGY
Payer: COMMERCIAL

## 2020-12-22 ENCOUNTER — ROUTINE PRENATAL (OUTPATIENT)
Dept: OBGYN | Facility: CLINIC | Age: 25
End: 2020-12-22

## 2020-12-22 VITALS — WEIGHT: 180 LBS | BODY MASS INDEX: 34.01 KG/M2 | SYSTOLIC BLOOD PRESSURE: 110 MMHG | DIASTOLIC BLOOD PRESSURE: 64 MMHG

## 2020-12-22 DIAGNOSIS — Z34.93 NORMAL PREGNANCY IN THIRD TRIMESTER: ICD-10-CM

## 2020-12-22 PROCEDURE — 90040 PR PRENATAL FOLLOW UP: CPT | Performed by: OBSTETRICS & GYNECOLOGY

## 2020-12-22 PROCEDURE — 90686 IIV4 VACC NO PRSV 0.5 ML IM: CPT | Performed by: OBSTETRICS & GYNECOLOGY

## 2020-12-22 PROCEDURE — 90471 IMMUNIZATION ADMIN: CPT | Performed by: OBSTETRICS & GYNECOLOGY

## 2020-12-22 ASSESSMENT — FIBROSIS 4 INDEX: FIB4 SCORE: 0.48

## 2020-12-22 NOTE — PROGRESS NOTES
EVITA:  37w3d    Pt reports doing well.  Denies vaginal bleeding, reports some contractions on and off but not regular painful.  Positive fetal movement.  Reports for the last 2 days had noticed some watery like clear discharge.  Never had a gush, never went down legs.    /64   Wt 81.6 kg (180 lb)   LMP 2020 (Exact Date)   BMI 34.01 kg/m²   gen: AAO, NAD  FHTs: 130  FH: 35    SSE: Negative pooling, Valsalva, nitrazine, ferning    A/P: 25 y.o.  @ 37w3d by lmp c/w 19wk US   Rh+/-, RI    GCCT neg, pap: ASCUS w negative HPV-> normal testing  TOLAC counseling performed at 18wks and repeat at 34wks: repeat c/s NO BTL.   Biliary colic - needs to fu with GI as outpatient. S/p ERCP on  but will need lap usman postpartum     Signs of rupture membranes today however discussed rupture membranes precautions and if with any-or increased watery discharge needs to be evaluated immediately on labor and delivery for rupture membranes.      Schedule for repeat c/s with me  @ 39wks;  scheduled at 930.  Patient instructed to have nothing to eat or drink after midnight, to arrive at hospital at 07 30  I discussed w/ pt risks of surgery including pain, bleeding, infection, risk of damage to intraabdominal structures including bowel/bladder.  Reviewed risks of transfusion including transfusion reaction (fever, damage to heart/lungs/kidneys), risk of exposure to infectious disease including HIV and hepatitis.  Pt confirms she is accepting of blood transfusion in case of emergency.  All questions answered.     Patient considering Nexplanon versus IUD for postpartum contraception, info given on all of these options today    GBS collected      RTC 1wks    Kirsten Sanches MD  [facility], Women's Health

## 2020-12-22 NOTE — PROGRESS NOTES
Pt here today for OB follow up / Pre OP  Reports +FM  GBS today  WT: 180 lb  BP: 110/64  Preferred pharmacy verified with pt.  Pt states she has been having contractions that come and go and leaking fluid for the past 2 days.   C/Section scheduled for 01/02/2021 @ 9:30 am pt notified and instructions given today  Desires the flu vaccine  Good # [phone]    Influenza vaccine given today. Left Deltoid. VIS given and screening check list reviewed with pt.  Influenzavaccine verified by Marcela LUCAS)

## 2020-12-23 ENCOUNTER — PRE-ADMISSION TESTING (OUTPATIENT)
Dept: ADMISSIONS | Facility: MEDICAL CENTER | Age: 25
End: 2020-12-23
Attending: OBSTETRICS & GYNECOLOGY
Payer: MEDICAID

## 2020-12-23 RX ORDER — URSODIOL 250 MG/1
250 TABLET, FILM COATED ORAL 3 TIMES DAILY
Status: ON HOLD | COMMUNITY
End: 2020-12-29

## 2020-12-24 LAB — GP B STREP DNA SPEC QL NAA+PROBE: NEGATIVE

## 2020-12-28 ENCOUNTER — ANESTHESIA (OUTPATIENT)
Dept: ANESTHESIOLOGY | Facility: MEDICAL CENTER | Age: 25
End: 2020-12-28
Payer: MEDICAID

## 2020-12-28 ENCOUNTER — ANESTHESIA EVENT (OUTPATIENT)
Dept: ANESTHESIOLOGY | Facility: MEDICAL CENTER | Age: 25
End: 2020-12-28
Payer: MEDICAID

## 2020-12-28 ENCOUNTER — HOSPITAL ENCOUNTER (INPATIENT)
Facility: MEDICAL CENTER | Age: 25
LOS: 1 days | End: 2020-12-29
Attending: OBSTETRICS & GYNECOLOGY | Admitting: OBSTETRICS & GYNECOLOGY
Payer: MEDICAID

## 2020-12-28 LAB
BASOPHILS # BLD AUTO: 0.1 % (ref 0–1.8)
BASOPHILS # BLD: 0.01 K/UL (ref 0–0.12)
COVID ORDER STATUS COVID19: NORMAL
CRYSTALS AMN MICRO: NORMAL
EOSINOPHIL # BLD AUTO: 0.04 K/UL (ref 0–0.51)
EOSINOPHIL NFR BLD: 0.5 % (ref 0–6.9)
ERYTHROCYTE [DISTWIDTH] IN BLOOD BY AUTOMATED COUNT: 40.7 FL (ref 35.9–50)
HCT VFR BLD AUTO: 39.6 % (ref 37–47)
HGB BLD-MCNC: 13 G/DL (ref 12–16)
HOLDING TUBE BB 8507: NORMAL
IMM GRANULOCYTES # BLD AUTO: 0.03 K/UL (ref 0–0.11)
IMM GRANULOCYTES NFR BLD AUTO: 0.3 % (ref 0–0.9)
LYMPHOCYTES # BLD AUTO: 2.48 K/UL (ref 1–4.8)
LYMPHOCYTES NFR BLD: 28.4 % (ref 22–41)
MCH RBC QN AUTO: 29 PG (ref 27–33)
MCHC RBC AUTO-ENTMCNC: 32.8 G/DL (ref 33.6–35)
MCV RBC AUTO: 88.2 FL (ref 81.4–97.8)
MONOCYTES # BLD AUTO: 0.31 K/UL (ref 0–0.85)
MONOCYTES NFR BLD AUTO: 3.6 % (ref 0–13.4)
NEUTROPHILS # BLD AUTO: 5.85 K/UL (ref 2–7.15)
NEUTROPHILS NFR BLD: 67.1 % (ref 44–72)
NRBC # BLD AUTO: 0 K/UL
NRBC BLD-RTO: 0 /100 WBC
PLATELET # BLD AUTO: 194 K/UL (ref 164–446)
PMV BLD AUTO: 12.8 FL (ref 9–12.9)
RBC # BLD AUTO: 4.49 M/UL (ref 4.2–5.4)
SARS-COV-2 RDRP RESP QL NAA+PROBE: NOTDETECTED
SPECIMEN SOURCE: NORMAL
WBC # BLD AUTO: 8.7 K/UL (ref 4.8–10.8)

## 2020-12-28 PROCEDURE — 700102 HCHG RX REV CODE 250 W/ 637 OVERRIDE(OP): Performed by: OBSTETRICS & GYNECOLOGY

## 2020-12-28 PROCEDURE — 700111 HCHG RX REV CODE 636 W/ 250 OVERRIDE (IP): Performed by: OBSTETRICS & GYNECOLOGY

## 2020-12-28 PROCEDURE — 36415 COLL VENOUS BLD VENIPUNCTURE: CPT

## 2020-12-28 PROCEDURE — A9270 NON-COVERED ITEM OR SERVICE: HCPCS | Performed by: OBSTETRICS & GYNECOLOGY

## 2020-12-28 PROCEDURE — 770002 HCHG ROOM/CARE - OB PRIVATE (112)

## 2020-12-28 PROCEDURE — 302449 STATCHG TRIAGE ONLY (STATISTIC)

## 2020-12-28 PROCEDURE — 89060 EXAM SYNOVIAL FLUID CRYSTALS: CPT

## 2020-12-28 PROCEDURE — C9803 HOPD COVID-19 SPEC COLLECT: HCPCS | Performed by: OBSTETRICS & GYNECOLOGY

## 2020-12-28 PROCEDURE — 59409 OBSTETRICAL CARE: CPT

## 2020-12-28 PROCEDURE — 59409 OBSTETRICAL CARE: CPT | Performed by: OBSTETRICS & GYNECOLOGY

## 2020-12-28 PROCEDURE — 10H07YZ INSERTION OF OTHER DEVICE INTO PRODUCTS OF CONCEPTION, VIA NATURAL OR ARTIFICIAL OPENING: ICD-10-PCS | Performed by: OBSTETRICS & GYNECOLOGY

## 2020-12-28 PROCEDURE — 304965 HCHG RECOVERY SERVICES

## 2020-12-28 PROCEDURE — 700111 HCHG RX REV CODE 636 W/ 250 OVERRIDE (IP)

## 2020-12-28 PROCEDURE — 303615 HCHG EPIDURAL/SPINAL ANESTHESIA FOR LABOR

## 2020-12-28 PROCEDURE — 700101 HCHG RX REV CODE 250: Performed by: ANESTHESIOLOGY

## 2020-12-28 PROCEDURE — 85025 COMPLETE CBC W/AUTO DIFF WBC: CPT

## 2020-12-28 PROCEDURE — 700105 HCHG RX REV CODE 258: Performed by: OBSTETRICS & GYNECOLOGY

## 2020-12-28 PROCEDURE — U0004 COV-19 TEST NON-CDC HGH THRU: HCPCS

## 2020-12-28 RX ORDER — SODIUM CHLORIDE, SODIUM LACTATE, POTASSIUM CHLORIDE, CALCIUM CHLORIDE 600; 310; 30; 20 MG/100ML; MG/100ML; MG/100ML; MG/100ML
INJECTION, SOLUTION INTRAVENOUS PRN
Status: DISCONTINUED | OUTPATIENT
Start: 2020-12-28 | End: 2020-12-29 | Stop reason: HOSPADM

## 2020-12-28 RX ORDER — ONDANSETRON 2 MG/ML
4 INJECTION INTRAMUSCULAR; INTRAVENOUS EVERY 6 HOURS PRN
Status: DISCONTINUED | OUTPATIENT
Start: 2020-12-28 | End: 2020-12-29 | Stop reason: HOSPADM

## 2020-12-28 RX ORDER — CITRIC ACID/SODIUM CITRATE 334-500MG
30 SOLUTION, ORAL ORAL EVERY 6 HOURS PRN
Status: DISCONTINUED | OUTPATIENT
Start: 2020-12-28 | End: 2020-12-28 | Stop reason: HOSPADM

## 2020-12-28 RX ORDER — OXYCODONE HYDROCHLORIDE 10 MG/1
10 TABLET ORAL EVERY 4 HOURS PRN
Status: DISCONTINUED | OUTPATIENT
Start: 2020-12-28 | End: 2020-12-29 | Stop reason: HOSPADM

## 2020-12-28 RX ORDER — ROPIVACAINE HYDROCHLORIDE 2 MG/ML
INJECTION, SOLUTION EPIDURAL; INFILTRATION; PERINEURAL CONTINUOUS
Status: DISCONTINUED | OUTPATIENT
Start: 2020-12-28 | End: 2020-12-29 | Stop reason: HOSPADM

## 2020-12-28 RX ORDER — METOCLOPRAMIDE HYDROCHLORIDE 5 MG/ML
10 INJECTION INTRAMUSCULAR; INTRAVENOUS EVERY 6 HOURS PRN
Status: DISCONTINUED | OUTPATIENT
Start: 2020-12-28 | End: 2020-12-29 | Stop reason: HOSPADM

## 2020-12-28 RX ORDER — SODIUM CHLORIDE, SODIUM LACTATE, POTASSIUM CHLORIDE, AND CALCIUM CHLORIDE .6; .31; .03; .02 G/100ML; G/100ML; G/100ML; G/100ML
250 INJECTION, SOLUTION INTRAVENOUS PRN
Status: DISCONTINUED | OUTPATIENT
Start: 2020-12-28 | End: 2020-12-28 | Stop reason: HOSPADM

## 2020-12-28 RX ORDER — OXYCODONE HYDROCHLORIDE 5 MG/1
5 TABLET ORAL EVERY 4 HOURS PRN
Status: DISCONTINUED | OUTPATIENT
Start: 2020-12-28 | End: 2020-12-29 | Stop reason: HOSPADM

## 2020-12-28 RX ORDER — VITAMIN A ACETATE, BETA CAROTENE, ASCORBIC ACID, CHOLECALCIFEROL, .ALPHA.-TOCOPHEROL ACETATE, DL-, THIAMINE MONONITRATE, RIBOFLAVIN, NIACINAMIDE, PYRIDOXINE HYDROCHLORIDE, FOLIC ACID, CYANOCOBALAMIN, CALCIUM CARBONATE, FERROUS FUMARATE, ZINC OXIDE, CUPRIC OXIDE 3080; 12; 120; 400; 1; 1.84; 3; 20; 22; 920; 25; 200; 27; 10; 2 [IU]/1; UG/1; MG/1; [IU]/1; MG/1; MG/1; MG/1; MG/1; MG/1; [IU]/1; MG/1; MG/1; MG/1; MG/1; MG/1
1 TABLET, FILM COATED ORAL
Status: DISCONTINUED | OUTPATIENT
Start: 2020-12-29 | End: 2020-12-29 | Stop reason: HOSPADM

## 2020-12-28 RX ORDER — IBUPROFEN 800 MG/1
800 TABLET ORAL EVERY 8 HOURS PRN
Status: DISCONTINUED | OUTPATIENT
Start: 2020-12-28 | End: 2020-12-29 | Stop reason: HOSPADM

## 2020-12-28 RX ORDER — SODIUM CHLORIDE, SODIUM LACTATE, POTASSIUM CHLORIDE, AND CALCIUM CHLORIDE .6; .31; .03; .02 G/100ML; G/100ML; G/100ML; G/100ML
1000 INJECTION, SOLUTION INTRAVENOUS
Status: DISCONTINUED | OUTPATIENT
Start: 2020-12-28 | End: 2020-12-28 | Stop reason: HOSPADM

## 2020-12-28 RX ORDER — ACETAMINOPHEN 325 MG/1
325 TABLET ORAL EVERY 4 HOURS PRN
Status: DISCONTINUED | OUTPATIENT
Start: 2020-12-28 | End: 2020-12-29 | Stop reason: HOSPADM

## 2020-12-28 RX ORDER — LIDOCAINE HYDROCHLORIDE AND EPINEPHRINE 15; 5 MG/ML; UG/ML
INJECTION, SOLUTION EPIDURAL
Status: COMPLETED | OUTPATIENT
Start: 2020-12-28 | End: 2020-12-28

## 2020-12-28 RX ORDER — ALUMINA, MAGNESIA, AND SIMETHICONE 2400; 2400; 240 MG/30ML; MG/30ML; MG/30ML
30 SUSPENSION ORAL EVERY 6 HOURS PRN
Status: DISCONTINUED | OUTPATIENT
Start: 2020-12-28 | End: 2020-12-28 | Stop reason: HOSPADM

## 2020-12-28 RX ORDER — ONDANSETRON 4 MG/1
4 TABLET, ORALLY DISINTEGRATING ORAL EVERY 6 HOURS PRN
Status: DISCONTINUED | OUTPATIENT
Start: 2020-12-28 | End: 2020-12-29 | Stop reason: HOSPADM

## 2020-12-28 RX ORDER — ROPIVACAINE HYDROCHLORIDE 2 MG/ML
INJECTION, SOLUTION EPIDURAL; INFILTRATION; PERINEURAL
Status: COMPLETED
Start: 2020-12-28 | End: 2020-12-28

## 2020-12-28 RX ORDER — BISACODYL 10 MG
10 SUPPOSITORY, RECTAL RECTAL PRN
Status: DISCONTINUED | OUTPATIENT
Start: 2020-12-28 | End: 2020-12-29 | Stop reason: HOSPADM

## 2020-12-28 RX ORDER — DOCUSATE SODIUM 100 MG/1
100 CAPSULE, LIQUID FILLED ORAL 2 TIMES DAILY PRN
Status: DISCONTINUED | OUTPATIENT
Start: 2020-12-28 | End: 2020-12-29 | Stop reason: HOSPADM

## 2020-12-28 RX ORDER — SODIUM CHLORIDE, SODIUM LACTATE, POTASSIUM CHLORIDE, CALCIUM CHLORIDE 600; 310; 30; 20 MG/100ML; MG/100ML; MG/100ML; MG/100ML
INJECTION, SOLUTION INTRAVENOUS CONTINUOUS
Status: ACTIVE | OUTPATIENT
Start: 2020-12-28 | End: 2020-12-28

## 2020-12-28 RX ADMIN — SODIUM CHLORIDE, POTASSIUM CHLORIDE, SODIUM LACTATE AND CALCIUM CHLORIDE 125 ML: 600; 310; 30; 20 INJECTION, SOLUTION INTRAVENOUS at 10:52

## 2020-12-28 RX ADMIN — FENTANYL CITRATE 100 MCG: 50 INJECTION, SOLUTION INTRAMUSCULAR; INTRAVENOUS at 12:41

## 2020-12-28 RX ADMIN — IBUPROFEN 800 MG: 800 TABLET, FILM COATED ORAL at 22:20

## 2020-12-28 RX ADMIN — SODIUM CHLORIDE, POTASSIUM CHLORIDE, SODIUM LACTATE AND CALCIUM CHLORIDE: 600; 310; 30; 20 INJECTION, SOLUTION INTRAVENOUS at 15:45

## 2020-12-28 RX ADMIN — ROPIVACAINE HYDROCHLORIDE 200 MG: 2 INJECTION, SOLUTION EPIDURAL; INFILTRATION; PERINEURAL at 13:35

## 2020-12-28 RX ADMIN — LIDOCAINE HYDROCHLORIDE,EPINEPHRINE BITARTRATE 5 ML: 15; .005 INJECTION, SOLUTION EPIDURAL; INFILTRATION; INTRACAUDAL; PERINEURAL at 13:32

## 2020-12-28 RX ADMIN — OXYTOCIN 125 ML/HR: 10 INJECTION, SOLUTION INTRAMUSCULAR; INTRAVENOUS at 19:33

## 2020-12-28 RX ADMIN — ROPIVACAINE HYDROCHLORIDE 200 MG: 2 INJECTION, SOLUTION EPIDURAL; INFILTRATION at 13:35

## 2020-12-28 RX ADMIN — OXYTOCIN 1 MILLI-UNITS/MIN: 10 INJECTION, SOLUTION INTRAMUSCULAR; INTRAVENOUS at 15:46

## 2020-12-28 RX ADMIN — SODIUM CHLORIDE, POTASSIUM CHLORIDE, SODIUM LACTATE AND CALCIUM CHLORIDE 125 ML: 600; 310; 30; 20 INJECTION, SOLUTION INTRAVENOUS at 12:42

## 2020-12-28 SDOH — ECONOMIC STABILITY: FOOD INSECURITY: WITHIN THE PAST 12 MONTHS, THE FOOD YOU BOUGHT JUST DIDN'T LAST AND YOU DIDN'T HAVE MONEY TO GET MORE.: NEVER TRUE

## 2020-12-28 SDOH — ECONOMIC STABILITY: FOOD INSECURITY: WITHIN THE PAST 12 MONTHS, YOU WORRIED THAT YOUR FOOD WOULD RUN OUT BEFORE YOU GOT MONEY TO BUY MORE.: NEVER TRUE

## 2020-12-28 SDOH — ECONOMIC STABILITY: TRANSPORTATION INSECURITY
IN THE PAST 12 MONTHS, HAS LACK OF TRANSPORTATION KEPT YOU FROM MEETINGS, WORK, OR FROM GETTING THINGS NEEDED FOR DAILY LIVING?: NO

## 2020-12-28 SDOH — ECONOMIC STABILITY: TRANSPORTATION INSECURITY
IN THE PAST 12 MONTHS, HAS THE LACK OF TRANSPORTATION KEPT YOU FROM MEDICAL APPOINTMENTS OR FROM GETTING MEDICATIONS?: NO

## 2020-12-28 ASSESSMENT — PAIN SCALES - GENERAL: PAIN_LEVEL: 0

## 2020-12-28 ASSESSMENT — FIBROSIS 4 INDEX: FIB4 SCORE: 0.48

## 2020-12-28 ASSESSMENT — LIFESTYLE VARIABLES: EVER_SMOKED: NEVER

## 2020-12-28 ASSESSMENT — PAIN DESCRIPTION - PAIN TYPE: TYPE: ACUTE PAIN

## 2020-12-28 NOTE — PROGRESS NOTES
1305 - Received report from FRANCO Ramirez RN. Assumed care, Pt tearful with painful contractions, requesting an epidural. LR bolus started.  1310 - CLEMENCIA Paige CNM at bedside. SVE - 6cm  1325 - Dr Gonzalez at bedside for epidural placement. VSS.   1345 - Pt wedged to right side, comfortable with epidural, not feeling pain with contractions.   1539 - Dr Duncan to bedside. SVE - 6//-1. IUPC placed. Orders to start pitocin slowly for augmentation.   1800 - Pt feeling an urge to push. SVE - complete/+2. Dr Duncan notified.   180 - Delivery of viable baby girl, apgar 8/9. Baby placed on mother's chest.   190 - Report given to Jose G.

## 2020-12-28 NOTE — PROGRESS NOTES
1000 Report received from Meghana pt plan of care discussed pt care assumed. Pt and  oriented to . Pt to bed monitors replaced. Positioned for comfort. 1030 IV start attempted by RN and unsucessful, Levar in for IV start. Successful. Admit history discussed. Questions answered. Fentanyl 100Mcg IV given per MD order. Pt teaching done regarding drug.   1200 Pt request epidural, permits signed, MD notified. Scheduled CS scheduled at 1200. 1242 Pt crying with pain. Midwife consulted,  Fentanyl 100 mcg repeated with plan to get epidural as soon as Anesthesia MD is available. Pt and  advised of epidural timeline.  down to cafeteria to get food, Pt napping.  1305 Pt report to Hailey, pt plan of care discussed pt care assumed.

## 2020-12-28 NOTE — H&P
History and Physical      Tita Ribera is a 25 y.o. year old female  at 38w2d who presents for SROM at 0500, with regular CTX.  Her pregnancy is complicated by hx of C/S x1  and gallstones.  Subjective:   positive  For CTXS.   positive Feels pain   positive for LOF  negative for vaginal bleeding.   positive for fetal movement    ROS: Pertinent items are noted in HPI.    Past Medical History:   Diagnosis Date   • Heart burn     with pregnancy   • Migraines 2020   • Pregnant 2020   • Urinary incontinence      Past Surgical History:   Procedure Laterality Date   • PB ERCP,DIAGNOSTIC N/A 2020    Procedure: ERCP (ENDOSCOPIC RETROGRADE CHOLANGIOPANCREATOGRAPHY);  Surgeon: Ahsan Mtichell M.D.;  Location: SURGERY [facility];  Service: Gastroenterology   • PRIMARY C SECTION       OB History    Para Term  AB Living   3 2 2     2   SAB TAB Ectopic Molar Multiple Live Births             2      # Outcome Date GA Lbr Jose/2nd Weight Sex Delivery Anes PTL Lv   3 Current            2 Term 18 39w0d  3.714 kg (8 lb 3 oz) M CS-Unspec   KECIA      Birth Comments: Pt states having issues with her placenta, baby was also having respiration problems   1 Term 11/09/15 39w0d  3.204 kg (7 lb 1 oz) F Vag-Spont EPI  KECIA     Social History     Socioeconomic History   • Marital status:      Spouse name: Not on file   • Number of children: Not on file   • Years of education: Not on file   • Highest education level: Not on file   Occupational History   • Not on file   Social Needs   • Financial resource strain: Not on file   • Food insecurity     Worry: Not on file     Inability: Not on file   • Transportation needs     Medical: Not on file     Non-medical: Not on file   Tobacco Use   • Smoking status: Never Smoker   • Smokeless tobacco: Never Used   Substance and Sexual Activity   • Alcohol use: Never     Frequency: Never   • Drug use: Never   • Sexual activity: Yes     Partners: Male      "Comment: none   Lifestyle   • Physical activity     Days per week: Not on file     Minutes per session: Not on file   • Stress: Not on file   Relationships   • Social connections     Talks on phone: Not on file     Gets together: Not on file     Attends Restorationism service: Not on file     Active member of club or organization: Not on file     Attends meetings of clubs or organizations: Not on file     Relationship status: Not on file   • Intimate partner violence     Fear of current or ex partner: Not on file     Emotionally abused: Not on file     Physically abused: Not on file     Forced sexual activity: Not on file   Other Topics Concern   • Not on file   Social History Narrative   • Not on file     Allergies: Patient has no known allergies.  No current facility-administered medications for this encounter.     Prenatal care with TPC starting at 12w3d, 7 total visits with following problems:  Patient Active Problem List    Diagnosis Date Noted   • Acid indigestion 2020   • ASCUS of cervix with negative high risk HPV 2020   • Supervision of other high risk pregnancy, antepartum 2020   • History of  delivery- desires repeat, unsure about tubal 2020   • Migraines 2020         Objective:      /79   Pulse 96   Temp 36.4 °C (97.5 °F) (Temporal)   Resp 18   Ht 1.549 m (5' 1\")   Wt 81.6 kg (180 lb)   SpO2 96%     General:   no acute distress   Skin:   normal   HEENT:  PERRLA   Lungs:   CTA bilateral   Heart:   S1, S2 normal, no murmur, click, rub or gallop, regular rate and rhythm, brisk carotid upstroke without bruits, peripheral pulses very brisk, chest is clear without rales or wheezing, no pedal edema, no JVD, no hepatosplenomegaly   Abdomen:   gravid, NT   EFW:  3200 g   Pelvis:  Exam deferred., proven to 3204 g   FHTs: 140 BPM + accels - decels moderate variability   Contractions: 2-3 contractions in 10 min firm to palpation   Uterine Size: S=D   Presentations: " Cephalic per physician   Cervix: Per physician    Dilation: 5cm    Effacement: 75%    Station:  -2    Consistency: Soft    Position: Middle     Complete OB US  11/29/2020 4:50 PM     HISTORY/REASON FOR EXAM:  Other - please comment; Poor weight gain.  If less than 10th percentile, please perform umbilical artery Dopplers        TECHNIQUE/EXAM DESCRIPTION: OB limited ultrasound.     COMPARISON:  Obstetric ultrasound 8/24/2020     FINDINGS:  Fetal Lie:  Vertex  LMP:  04/04/2020  Clinical NIRAV by LMP:  01/09/2021     Placenta (Location):  Posterior  Placenta Previa: No     Amniotic Fluid Volume:  JACQUELYN = 14.68 cm     Fetal Heart Rate:  149     No maternal adnexal mass is identified.     Umbilical Artery S/D Ratio(s): Not applicable     Fetal Biometry  BPD    8.24 cm, 33 weeks, 1 days, (21 Percent)  HC    30.92 cm, 34 weeks, 4 days, (24 Percent)  AC    29.66 cm, 33 weeks, 5 days, (39 Percent)  Femur Length    6.74 cm, 34 weeks, 5 days, (54 Percent)  Humerus Length    5.91 cm, 34 weeks, 2 days, (62 Percent)     EGA by this US:  34 weeks, 1 days  NIRAV by this US: 01/09/2021  NIRAV by 1st US:  1/13/2021     Estimated Fetal Weight:  2319 grams  EFW Percentile: 38 Percent     Comments:  Size equals dates     IMPRESSION:     1.  Single intrauterine pregnancy of an estimated gestational age of 34 weeks, 1 days with an estimated date of delivery of 01/09/2021.     2.  Size equals dates. Appropriate interval growth, amniotic fluid index is normal, and the estimated fetal weight is 30th percentile          Lab Review  Lab:   Blood type: O     Recent Results (from the past 5880 hour(s))   POCT Pregnancy    Collection Time: 05/14/20  2:53 PM   Result Value Ref Range    POC Urine Pregnancy Test positive Negative    Internal Control Positive Positive     Internal Control Negative Negative    THINPREP RFLX HPV ASCUS W/CTNG    Collection Time: 06/30/20  4:17 PM   Result Value Ref Range    Cytology Reg See Path Report     C. trachomatis by  PCR Negative Negative    N. gonorrhoeae by PCR Negative Negative    Source Endo/Cervical    HPV by PCR Assoc. w/Thinprep    Collection Time: 06/30/20  4:17 PM   Result Value Ref Range    Source Endo/Cervical     HPV Genotype 16 Negative Negative    HPV Genotype 18 Negative Negative    HPV Other High Risk Genotypes Negative Negative   POC UA    Collection Time: 09/27/20  2:26 AM   Result Value Ref Range    POC Color Yellow     POC Appearance Clear     POC Glucose Negative Negative mg/dL    POC Ketones Negative Negative mg/dL    POC Specific Gravity 1.020 1.005 - 1.030    POC Blood Negative Negative    POC Urine PH 6.0 5.0 - 8.0    POC Protein Negative Negative mg/dL    POC Nitrites Negative Negative    POC Leukocyte Esterase Negative Negative   CBC WITH DIFFERENTIAL    Collection Time: 09/27/20  3:23 AM   Result Value Ref Range    WBC 13.0 (H) 4.8 - 10.8 K/uL    RBC 4.20 4.20 - 5.40 M/uL    Hemoglobin 12.7 12.0 - 16.0 g/dL    Hematocrit 38.0 37.0 - 47.0 %    MCV 90.5 81.4 - 97.8 fL    MCH 30.2 27.0 - 33.0 pg    MCHC 33.4 (L) 33.6 - 35.0 g/dL    RDW 41.1 35.9 - 50.0 fL    Platelet Count 209 164 - 446 K/uL    MPV 10.9 9.0 - 12.9 fL    Neutrophils-Polys 81.00 (H) 44.00 - 72.00 %    Lymphocytes 13.70 (L) 22.00 - 41.00 %    Monocytes 3.70 0.00 - 13.40 %    Eosinophils 0.90 0.00 - 6.90 %    Basophils 0.20 0.00 - 1.80 %    Immature Granulocytes 0.50 0.00 - 0.90 %    Nucleated RBC 0.00 /100 WBC    Neutrophils (Absolute) 10.53 (H) 2.00 - 7.15 K/uL    Lymphs (Absolute) 1.79 1.00 - 4.80 K/uL    Monos (Absolute) 0.48 0.00 - 0.85 K/uL    Eos (Absolute) 0.12 0.00 - 0.51 K/uL    Baso (Absolute) 0.03 0.00 - 0.12 K/uL    Immature Granulocytes (abs) 0.07 0.00 - 0.11 K/uL    NRBC (Absolute) 0.00 K/uL   Comp Metabolic Panel    Collection Time: 09/27/20  3:23 AM   Result Value Ref Range    Sodium 134 (L) 135 - 145 mmol/L    Potassium 3.7 3.6 - 5.5 mmol/L    Chloride 100 96 - 112 mmol/L    Co2 20 20 - 33 mmol/L    Anion Gap 14.0 7.0 -  16.0    Glucose 89 65 - 99 mg/dL    Bun 9 8 - 22 mg/dL    Creatinine 0.55 0.50 - 1.40 mg/dL    Calcium 8.6 8.5 - 10.5 mg/dL    AST(SGOT) 26 12 - 45 U/L    ALT(SGPT) 13 2 - 50 U/L    Alkaline Phosphatase 93 30 - 99 U/L    Total Bilirubin 0.2 0.1 - 1.5 mg/dL    Albumin 3.5 3.2 - 4.9 g/dL    Total Protein 6.7 6.0 - 8.2 g/dL    Globulin 3.2 1.9 - 3.5 g/dL    A-G Ratio 1.1 g/dL   AMYLASE    Collection Time: 09/27/20  3:23 AM   Result Value Ref Range    Amylase 55 20 - 103 U/L   LIPASE    Collection Time: 09/27/20  3:23 AM   Result Value Ref Range    Lipase 31 11 - 82 U/L   ESTIMATED GFR    Collection Time: 09/27/20  3:23 AM   Result Value Ref Range    GFR If African American >60 >60 mL/min/1.73 m 2    GFR If Non African American >60 >60 mL/min/1.73 m 2   GLUCOSE 1HR GESTATIONAL    Collection Time: 10/07/20  9:25 AM   Result Value Ref Range    Glucose, Post Dose 89 70 - 139 mg/dL   PREG CNTR PRENATAL PN    Collection Time: 10/07/20  9:25 AM   Result Value Ref Range    Color Yellow     Character Cloudy (A)     Specific Gravity 1.017 <1.035    Ph 7.0 5.0 - 8.0    Glucose Negative Negative mg/dL    Ketones Trace (A) Negative mg/dL    Protein Negative Negative mg/dL    Bilirubin Negative Negative    Urobilinogen, Urine 0.2 Negative    Nitrite Negative Negative    Leukocyte Esterase Moderate (A) Negative    Occult Blood Negative Negative    Micro Urine Req Microscopic     WBC 7.6 4.8 - 10.8 K/uL    RBC 4.63 4.20 - 5.40 M/uL    Hemoglobin 13.8 12.0 - 16.0 g/dL    Hematocrit 43.5 37.0 - 47.0 %    MCV 94.0 81.4 - 97.8 fL    MCH 29.8 27.0 - 33.0 pg    MCHC 31.7 (L) 33.6 - 35.0 g/dL    RDW 45.0 35.9 - 50.0 fL    Platelet Count 232 164 - 446 K/uL    MPV 11.8 9.0 - 12.9 fL    Neutrophils-Polys 70.40 44.00 - 72.00 %    Lymphocytes 22.90 22.00 - 41.00 %    Monocytes 3.70 0.00 - 13.40 %    Eosinophils 2.40 0.00 - 6.90 %    Basophils 0.30 0.00 - 1.80 %    Immature Granulocytes 0.30 0.00 - 0.90 %    Nucleated RBC 0.00 /100 WBC     Neutrophils (Absolute) 5.32 2.00 - 7.15 K/uL    Lymphs (Absolute) 1.73 1.00 - 4.80 K/uL    Monos (Absolute) 0.28 0.00 - 0.85 K/uL    Eos (Absolute) 0.18 0.00 - 0.51 K/uL    Baso (Absolute) 0.02 0.00 - 0.12 K/uL    Immature Granulocytes (abs) 0.02 0.00 - 0.11 K/uL    NRBC (Absolute) 0.00 K/uL    Hepatitis B Surface Antigen Non-Reactive Non-Reactive    Rubella IgG Antibody 125.00 IU/mL    Syphilis, Treponemal Qual Non-Reactive Non-Reactive   HIV AG/AB COMBO ASSAY SCREENING    Collection Time: 10/07/20  9:25 AM   Result Value Ref Range    HIV Ag/Ab Combo Assay Non-Reactive Non Reactive   URINE DRUG SCREEN W/O CONF (AR)    Collection Time: 10/07/20  9:25 AM   Result Value Ref Range    Urine Amphetamine-Methamphetam Negative Cutoff 300 ng/mL    Barbiturates Negative Cutoff 200 ng/mL    Benzodiazepines Negative Cutoff 200 ng/mL    Propoxyphene Negative Cutoff 300 ng/mL    Cocaine Metabolite Negative Cutoff 150 ng/mL    Methadone Negative Cutoff 150 ng/mL    Codeine-Morphine Negative Cutoff 300 ng/mL    Phencyclidine -Pcp Negative Cutoff 25 ng/mL    Cannabinoid Metab Negative Cutoff 20 ng/mL    Oxycodone Negative Cutoff 100 ng/mL    MDMA (Ecstasy) Negative Cutoff 500 ng/mL    Drug Comment Urine Drugs See Note    OP Prenatal Panel-Blood Bank    Collection Time: 10/07/20  9:25 AM   Result Value Ref Range    ABO Grouping Only O     Rh Grouping Only POS     Antibody Screen Scrn NEG    URINE MICROSCOPIC (W/UA)    Collection Time: 10/07/20  9:25 AM   Result Value Ref Range    WBC 2-5 /hpf    RBC 2-5 (A) /hpf    Bacteria Many (A) None /hpf    Epithelial Cells Few /hpf    Hyaline Cast 3-5 (A) /lpf   FREE THYROXINE    Collection Time: 10/07/20  9:26 AM   Result Value Ref Range    Free T-4 1.17 0.93 - 1.70 ng/dL   TSH    Collection Time: 10/07/20  9:26 AM   Result Value Ref Range    TSH 1.470 0.380 - 5.330 uIU/mL   HEP C VIRUS ANTIBODY    Collection Time: 10/07/20  9:26 AM   Result Value Ref Range    Hepatitis C Antibody  Non-Reactive Non-Reactive   CBC WITH DIFFERENTIAL    Collection Time: 11/03/20  2:02 PM   Result Value Ref Range    WBC 11.5 (H) 4.8 - 10.8 K/uL    RBC 4.13 (L) 4.20 - 5.40 M/uL    Hemoglobin 12.1 12.0 - 16.0 g/dL    Hematocrit 37.6 37.0 - 47.0 %    MCV 91.0 81.4 - 97.8 fL    MCH 29.3 27.0 - 33.0 pg    MCHC 32.2 (L) 33.6 - 35.0 g/dL    RDW 41.1 35.9 - 50.0 fL    Platelet Count 238 164 - 446 K/uL    MPV 11.4 9.0 - 12.9 fL    Neutrophils-Polys 86.30 (H) 44.00 - 72.00 %    Lymphocytes 11.00 (L) 22.00 - 41.00 %    Monocytes 2.00 0.00 - 13.40 %    Eosinophils 0.20 0.00 - 6.90 %    Basophils 0.20 0.00 - 1.80 %    Immature Granulocytes 0.30 0.00 - 0.90 %    Nucleated RBC 0.00 /100 WBC    Neutrophils (Absolute) 9.96 (H) 2.00 - 7.15 K/uL    Lymphs (Absolute) 1.27 1.00 - 4.80 K/uL    Monos (Absolute) 0.23 0.00 - 0.85 K/uL    Eos (Absolute) 0.02 0.00 - 0.51 K/uL    Baso (Absolute) 0.02 0.00 - 0.12 K/uL    Immature Granulocytes (abs) 0.04 0.00 - 0.11 K/uL    NRBC (Absolute) 0.00 K/uL   Comp Metabolic Panel    Collection Time: 11/03/20  2:02 PM   Result Value Ref Range    Sodium 134 (L) 135 - 145 mmol/L    Potassium 3.5 (L) 3.6 - 5.5 mmol/L    Chloride 104 96 - 112 mmol/L    Co2 21 20 - 33 mmol/L    Anion Gap 9.0 7.0 - 16.0    Glucose 75 65 - 99 mg/dL    Bun 5 (L) 8 - 22 mg/dL    Creatinine 0.48 (L) 0.50 - 1.40 mg/dL    Calcium 8.2 (L) 8.5 - 10.5 mg/dL    AST(SGOT) 12 12 - 45 U/L    ALT(SGPT) 13 2 - 50 U/L    Alkaline Phosphatase 100 (H) 30 - 99 U/L    Total Bilirubin 0.2 0.1 - 1.5 mg/dL    Albumin 3.0 (L) 3.2 - 4.9 g/dL    Total Protein 6.1 6.0 - 8.2 g/dL    Globulin 3.1 1.9 - 3.5 g/dL    A-G Ratio 1.0 g/dL   ESTIMATED GFR    Collection Time: 11/03/20  2:02 PM   Result Value Ref Range    GFR If African American >60 >60 mL/min/1.73 m 2    GFR If Non African American >60 >60 mL/min/1.73 m 2   CBC WITH DIFFERENTIAL    Collection Time: 11/29/20 12:07 PM   Result Value Ref Range    WBC 7.4 4.8 - 10.8 K/uL    RBC 4.53 4.20 - 5.40  M/uL    Hemoglobin 13.3 12.0 - 16.0 g/dL    Hematocrit 41.5 37.0 - 47.0 %    MCV 91.6 81.4 - 97.8 fL    MCH 29.4 27.0 - 33.0 pg    MCHC 32.0 (L) 33.6 - 35.0 g/dL    RDW 42.1 35.9 - 50.0 fL    Platelet Count 222 164 - 446 K/uL    MPV 11.2 9.0 - 12.9 fL    Neutrophils-Polys 74.20 (H) 44.00 - 72.00 %    Lymphocytes 19.90 (L) 22.00 - 41.00 %    Monocytes 4.80 0.00 - 13.40 %    Eosinophils 0.40 0.00 - 6.90 %    Basophils 0.30 0.00 - 1.80 %    Immature Granulocytes 0.40 0.00 - 0.90 %    Nucleated RBC 0.00 /100 WBC    Neutrophils (Absolute) 5.52 2.00 - 7.15 K/uL    Lymphs (Absolute) 1.48 1.00 - 4.80 K/uL    Monos (Absolute) 0.36 0.00 - 0.85 K/uL    Eos (Absolute) 0.03 0.00 - 0.51 K/uL    Baso (Absolute) 0.02 0.00 - 0.12 K/uL    Immature Granulocytes (abs) 0.03 0.00 - 0.11 K/uL    NRBC (Absolute) 0.00 K/uL   Comp Metabolic Panel    Collection Time: 11/29/20 12:07 PM   Result Value Ref Range    Sodium 135 135 - 145 mmol/L    Potassium 3.8 3.6 - 5.5 mmol/L    Chloride 104 96 - 112 mmol/L    Co2 22 20 - 33 mmol/L    Anion Gap 9.0 7.0 - 16.0    Glucose 75 65 - 99 mg/dL    Bun 7 (L) 8 - 22 mg/dL    Creatinine 0.53 0.50 - 1.40 mg/dL    Calcium 9.1 8.5 - 10.5 mg/dL    AST(SGOT) 32 12 - 45 U/L    ALT(SGPT) 26 2 - 50 U/L    Alkaline Phosphatase 183 (H) 30 - 99 U/L    Total Bilirubin 0.9 0.1 - 1.5 mg/dL    Albumin 3.6 3.2 - 4.9 g/dL    Total Protein 7.0 6.0 - 8.2 g/dL    Globulin 3.4 1.9 - 3.5 g/dL    A-G Ratio 1.1 g/dL   LIPASE    Collection Time: 11/29/20 12:07 PM   Result Value Ref Range    Lipase 44 11 - 82 U/L   AMYLASE    Collection Time: 11/29/20 12:07 PM   Result Value Ref Range    Amylase 68 20 - 103 U/L   ESTIMATED GFR    Collection Time: 11/29/20 12:07 PM   Result Value Ref Range    GFR If African American >60 >60 mL/min/1.73 m 2    GFR If Non African American >60 >60 mL/min/1.73 m 2   COVID/SARS CoV-2 PCR    Collection Time: 11/29/20  2:06 PM    Specimen: Nasopharyngeal; Respirate   Result Value Ref Range    COVID Order  Status Received    SARS-COV Antigen MARIEL    Collection Time: 11/29/20  2:06 PM   Result Value Ref Range    SARS-CoV-2 Source Nasal Swab     SARS-COV ANTIGEN MARIEL NotDetected Not-Detected   Comp Metabolic Panel    Collection Time: 11/30/20 10:00 AM   Result Value Ref Range    Sodium 138 135 - 145 mmol/L    Potassium 3.9 3.6 - 5.5 mmol/L    Chloride 105 96 - 112 mmol/L    Co2 27 20 - 33 mmol/L    Anion Gap 6.0 (L) 7.0 - 16.0    Glucose 99 65 - 99 mg/dL    Bun 6 (L) 8 - 22 mg/dL    Creatinine 0.56 0.50 - 1.40 mg/dL    Calcium 8.8 8.5 - 10.5 mg/dL    AST(SGOT) 19 12 - 45 U/L    ALT(SGPT) 23 2 - 50 U/L    Alkaline Phosphatase 159 (H) 30 - 99 U/L    Total Bilirubin 0.4 0.1 - 1.5 mg/dL    Albumin 3.2 3.2 - 4.9 g/dL    Total Protein 6.3 6.0 - 8.2 g/dL    Globulin 3.1 1.9 - 3.5 g/dL    A-G Ratio 1.0 g/dL   MAGNESIUM    Collection Time: 11/30/20 10:00 AM   Result Value Ref Range    Magnesium 1.7 1.5 - 2.5 mg/dL   CBC WITH DIFFERENTIAL    Collection Time: 11/30/20 10:00 AM   Result Value Ref Range    WBC 5.7 4.8 - 10.8 K/uL    RBC 4.26 4.20 - 5.40 M/uL    Hemoglobin 12.7 12.0 - 16.0 g/dL    Hematocrit 39.5 37.0 - 47.0 %    MCV 92.7 81.4 - 97.8 fL    MCH 29.8 27.0 - 33.0 pg    MCHC 32.2 (L) 33.6 - 35.0 g/dL    RDW 43.2 35.9 - 50.0 fL    Platelet Count 199 164 - 446 K/uL    MPV 11.3 9.0 - 12.9 fL    Neutrophils-Polys 66.30 44.00 - 72.00 %    Lymphocytes 27.60 22.00 - 41.00 %    Monocytes 4.90 0.00 - 13.40 %    Eosinophils 0.70 0.00 - 6.90 %    Basophils 0.20 0.00 - 1.80 %    Immature Granulocytes 0.30 0.00 - 0.90 %    Nucleated RBC 0.00 /100 WBC    Neutrophils (Absolute) 3.79 2.00 - 7.15 K/uL    Lymphs (Absolute) 1.58 1.00 - 4.80 K/uL    Monos (Absolute) 0.28 0.00 - 0.85 K/uL    Eos (Absolute) 0.04 0.00 - 0.51 K/uL    Baso (Absolute) 0.01 0.00 - 0.12 K/uL    Immature Granulocytes (abs) 0.02 0.00 - 0.11 K/uL    NRBC (Absolute) 0.00 K/uL   ESTIMATED GFR    Collection Time: 11/30/20 10:00 AM   Result Value Ref Range    GFR If   American >60 >60 mL/min/1.73 m 2    GFR If Non African American >60 >60 mL/min/1.73 m 2   GRP B STREP, BY PCR (WILSON BROTH)    Collection Time: 20  2:17 PM    Specimen: Genital   Result Value Ref Range    Strep Gp B DNA PCR Negative Negative   [place] if suspected rupture of membranes (ROM)    Collection Time: 20  7:34 AM   Result Value Ref Range    Fern Test On Amniotic Fluid see below Not present   COVID/SARS CoV-2 PCR    Collection Time: 20  9:10 AM    Specimen: Nasal; Respirate   Result Value Ref Range    COVID Order Status Received    SARS-CoV-2, PCR (In-House)    Collection Time: 20  9:10 AM   Result Value Ref Range    SARS-CoV-2 Source Nasal Swab     SARS-CoV-2 (RdRp gene) NotDetected         Assessment:   1.  IUP at 38w2d  2.  Labor status: Active phase labor.  3.  Cat 1 FHTs  4.  Obstetrical history significant for   Patient Active Problem List    Diagnosis Date Noted   • Acid indigestion 2020   • ASCUS of cervix with negative high risk HPV 2020   • Supervision of other high risk pregnancy, antepartum 2020   • History of  delivery- desires repeat, unsure about tubal 2020   • Migraines 2020   .      Plan:     Admit to L&D  GBS negative  Routine labs  Pain management prn    Belén Paige CNM, APRN

## 2020-12-28 NOTE — PROGRESS NOTES
Patient comes in with complaints that she has had leaking of fluid starting at 0500, clear.  She is dewayne.  History of vaginal birth with first baby, c/s with second.  In chart, it looks like she had decided on a repeat c/s, but in conversation patient indicates that she would like to try for a vaginal delivery. Fern verifies rupture and SVE is 5/70/-2. This is discussed with Dr Duncan and Belén.  Patient admitted for TOLAC.  Report given to Marce JORDAN RN.

## 2020-12-28 NOTE — ANESTHESIA PREPROCEDURE EVALUATION
24 yo  38+2 frazier w/hx prior c/s and attempting TOLAC    Relevant Problems   ANESTHESIA (within normal limits)      CARDIAC   (+) Migraines       Physical Exam    Airway   Mallampati: II  TM distance: >3 FB  Neck ROM: full       Cardiovascular   Rhythm: regular  Rate: normal  (-) murmur     Dental - normal exam           Pulmonary   Breath sounds clear to auscultation     Abdominal    Neurological - normal exam                 Anesthesia Plan    ASA 2       Plan - epidural   Neuraxial block will be labor analgesia              Pertinent diagnostic labs and testing reviewed    Informed Consent:    Anesthetic plan and risks discussed with patient.

## 2020-12-28 NOTE — ANESTHESIA PROCEDURE NOTES
Epidural Block    Date/Time: 12/28/2020 1:32 PM  Performed by: Dorie Gonzalez M.D.  Authorized by: Dorie Gonzalez M.D.     Patient Location:  OB  Start Time:  12/28/2020 1:32 PM  End Time:  12/28/2020 1:37 PM  Reason for Block: labor analgesia    patient identified, IV checked, site marked, risks and benefits discussed, surgical consent, monitors and equipment checked, pre-op evaluation and timeout performed    Patient Position:  Sitting  Prep: ChloraPrep, patient draped and sterile technique    Monitoring:  Blood pressure, continuous pulse oximetry and heart rate  Approach:  Midline  Location:  L3-L4  Injection Technique:  ELIE saline  Skin infiltration:  Lidocaine  Strength:  1%  Dose:  3ml  Needle Type:  Tuohy  Needle Gauge:  17 G  Needle Length:  3.5 in  Loss of resistance::  5  Catheter Size:  19 G  Catheter at Skin Depth:  9  Test Dose Result:  Negative  Events: paresthesia-transient/resolved    Events comment:  Right sided   Patient more comfortable after epidural

## 2020-12-28 NOTE — PROGRESS NOTES
"S: Patient is comfortable with epidural    O:/63   Pulse 80   Temp 37.1 °C (98.8 °F) (Temporal)   Resp 18   Ht 1.549 m (5' 1\")   Wt 81.6 kg (180 lb)   SpO2 98%      [place] - q 5-7 min  EFM - 130s, moderate variability, + accels  Cx - 6 cm/90%/-2    IUPC placed    A/P: 25-year-old  3 para 2-0-0-2 at 38-2/7 weeks who presented with spontaneous rupture of membranes at 5 AM today.  She has history of a previous  section, and desires trial of labor after  section.  Fetal status is reassuring.  We will begin Pitocin 1 milliunit/min, increase by 1 every 30 minutes.  Anticipate vaginal delivery.  "

## 2020-12-29 VITALS
DIASTOLIC BLOOD PRESSURE: 63 MMHG | BODY MASS INDEX: 33.99 KG/M2 | TEMPERATURE: 97.1 F | HEIGHT: 61 IN | HEART RATE: 64 BPM | OXYGEN SATURATION: 98 % | SYSTOLIC BLOOD PRESSURE: 100 MMHG | WEIGHT: 180 LBS | RESPIRATION RATE: 17 BRPM

## 2020-12-29 LAB
ERYTHROCYTE [DISTWIDTH] IN BLOOD BY AUTOMATED COUNT: 40.9 FL (ref 35.9–50)
HCT VFR BLD AUTO: 36.5 % (ref 37–47)
HGB BLD-MCNC: 11.8 G/DL (ref 12–16)
MCH RBC QN AUTO: 28.8 PG (ref 27–33)
MCHC RBC AUTO-ENTMCNC: 32.3 G/DL (ref 33.6–35)
MCV RBC AUTO: 89 FL (ref 81.4–97.8)
PLATELET # BLD AUTO: 175 K/UL (ref 164–446)
PMV BLD AUTO: 12.8 FL (ref 9–12.9)
RBC # BLD AUTO: 4.1 M/UL (ref 4.2–5.4)
WBC # BLD AUTO: 9.7 K/UL (ref 4.8–10.8)

## 2020-12-29 PROCEDURE — 36415 COLL VENOUS BLD VENIPUNCTURE: CPT

## 2020-12-29 PROCEDURE — A9270 NON-COVERED ITEM OR SERVICE: HCPCS | Performed by: OBSTETRICS & GYNECOLOGY

## 2020-12-29 PROCEDURE — 700102 HCHG RX REV CODE 250 W/ 637 OVERRIDE(OP): Performed by: OBSTETRICS & GYNECOLOGY

## 2020-12-29 PROCEDURE — 85027 COMPLETE CBC AUTOMATED: CPT

## 2020-12-29 RX ORDER — PSEUDOEPHEDRINE HCL 30 MG
100 TABLET ORAL 2 TIMES DAILY PRN
Qty: 120 CAP | Refills: 0 | Status: SHIPPED | OUTPATIENT
Start: 2020-12-29 | End: 2021-02-27

## 2020-12-29 RX ORDER — IBUPROFEN 800 MG/1
800 TABLET ORAL EVERY 8 HOURS PRN
Qty: 30 TAB | Refills: 0 | Status: SHIPPED | OUTPATIENT
Start: 2020-12-29 | End: 2021-01-28

## 2020-12-29 RX ADMIN — IBUPROFEN 800 MG: 800 TABLET, FILM COATED ORAL at 16:43

## 2020-12-29 RX ADMIN — OXYCODONE HYDROCHLORIDE 5 MG: 5 TABLET ORAL at 05:02

## 2020-12-29 ASSESSMENT — PAIN DESCRIPTION - PAIN TYPE
TYPE: ACUTE PAIN

## 2020-12-29 ASSESSMENT — EDINBURGH POSTNATAL DEPRESSION SCALE (EPDS)
I HAVE LOOKED FORWARD WITH ENJOYMENT TO THINGS: AS MUCH AS I EVER DID
I HAVE FELT SAD OR MISERABLE: NO, NOT AT ALL
I HAVE BEEN ANXIOUS OR WORRIED FOR NO GOOD REASON: NO, NOT AT ALL
THINGS HAVE BEEN GETTING ON TOP OF ME: NO, I HAVE BEEN COPING AS WELL AS EVER
I HAVE BEEN ABLE TO LAUGH AND SEE THE FUNNY SIDE OF THINGS: AS MUCH AS I ALWAYS COULD
THE THOUGHT OF HARMING MYSELF HAS OCCURRED TO ME: NEVER
I HAVE BEEN SO UNHAPPY THAT I HAVE BEEN CRYING: NO, NEVER
I HAVE BLAMED MYSELF UNNECESSARILY WHEN THINGS WENT WRONG: NOT VERY OFTEN
I HAVE FELT SCARED OR PANICKY FOR NO GOOD REASON: NO, NOT AT ALL
I HAVE BEEN SO UNHAPPY THAT I HAVE HAD DIFFICULTY SLEEPING: NOT AT ALL

## 2020-12-29 NOTE — PROGRESS NOTES
1855- Assumed care of pt, POC discussed, pt verbalized understanding.     1940- Infant placed in warmer under baby mode, epidural removed, dinner tray arrived and given to pt to eat.     2045- Pts leg still very heavy to move, will delay ambulating 20 min.     2115- Pt up to bathroom, ambulatory in stable condition. Successful void. Yane care provided. Gown changed, pads and panties applied. Pt transferred via wheelchair holding infant to MB  in stable condition. SBAR given to BERNADETTE Miller.

## 2020-12-29 NOTE — PROGRESS NOTES
Patient provided discharge education and follow up information. All questions answered. Emphasized importance of follow up  screen, patient verbalizes understanding.    1820- Car seat check complete, patient and infant escorted out of hospital by staff.

## 2020-12-29 NOTE — PROGRESS NOTES
Obstetrics & Gynecology Post-Delivery Progress Note    Date of Service      25 y.o.  s/p vaginal, spontaneous  Delivery date: 2020    Events  No events    Subjective  Pain: Yes,  controlled  Bleeding: lochia minimal  Tolerating PO: yes  Voiding: without difficulty  Ambulating: yes  Passing flatus: Yes  Feeding: breastfeeding , meeting with lactation today    Objective  24hr VS:  Temp:  [36.1 °C (96.9 °F)-37.2 °C (98.9 °F)] 36.2 °C (97.1 °F)  Pulse:  [] 64  Resp:  [17-18] 17  BP: ()/(51-79) 100/63  SpO2:  [95 %-100 %] 98 %    Physical Exam  General: well  Chest/Breasts: nipples intact   Abdomen: minimal tenderness, soft, non-distended  Fundus: firm and below umbilicus  Incision: not applicable, (vaginal delivery)  Perineum: deferred  Extremities: symmetric and no edema, calves nontender    Labs:  Hemoglobin 13.8 --> 11.8    Medications    •  prenatal plus vitamin, 1 Tab, Oral, Daily-0800      PRN medications: ondansetron **OR** ondansetron, metoclopramide, ibuprofen, acetaminophen, oxyCODONE immediate-release, oxyCODONE immediate release, LR, oxytocin, docusate sodium, bisacodyl, magnesium hydroxide      Assessment/Plan  Tita Ribera is a 25 y.o.yo  s/p postpartum day #1  s/p vaginal, spontaneous    - Post care: meeting all goals  - Meeting with lactation today  - Pain: controlled  - Rh+, Rubella Immune  - Method of Feeding: plans to breastfeed  - Method of Contraception: will discuss at discharge  VTE prophylaxis: SCDs    - Disposition: likely home postpartum day 1-2.

## 2020-12-29 NOTE — L&D DELIVERY NOTE
DELIVERY NOTE - Vaginal Birth After       Patient was admitted to Labor and Delivery at 38w2d for active labor. GBS neg.    Viable female infant delivered in OA position over intact perineum with apgars of 8 and 9, weight pending with epidural anesthesia. Infant placed on maternal abdomen.  Delayed cord clamping performed when cord stopped pulsating.  Cord cut.  Placenta delivered spontaneously, was 3 vessel and intact.  Cervix and perineum inspected.  No lacerations noted.  EBL 100cc.   20 units of Pitocin in  1000ml LR administered IV after delivery.  There were no complications.  Mother and infant in stable condition in room.

## 2020-12-29 NOTE — DISCHARGE INSTRUCTIONS
POSTPARTUM DISCHARGE INSTRUCTIONS FOR MOM    YOB: 1995   Age: 25 y.o.               Admit Date: 2020     Discharge Date: 2020  Attending Doctor:  Sahil Koch M.D.                  Allergies:  Patient has no known allergies.    Discharged to home by car. Discharged via wheelchair, hospital escort: Yes.  Special equipment needed: Not Applicable  Belongings with: Personal  Be sure to schedule a follow-up appointment with your primary care doctor or any specialists as instructed.     Discharge Plan:   Diet Plan: Discussed  Activity Level: Discussed  Confirmed Follow up Appointment: Patient to Call and Schedule Appointment  Confirmed Symptoms Management: Discussed  Medication Reconciliation Updated: Yes  Influenza Vaccine Indication: Not indicated: Previously immunized this influenza season and > 8 years of age    REASONS TO CALL YOUR OBSTETRICIAN:  1.   Persistent fever or shaking chills (Temperature higher than 100.4)  2.   Heavy bleeding (soaking more than 1 pad per hour); Passing clots  3.   Foul odor from vagina  4.   Mastitis (Breast infection; breast pain, chills, fever, redness)  5.   Urinary pain, burning or frequency  6.   Episiotomy infection  7.   Abdominal incision infection  8.   Severe depression longer than 24 hours    HAND WASHING  · Prior to handling the baby.  · Before breastfeeding or bottle feeding baby.  · After using the bathroom or changing the baby's diaper.    WOUND CARE  Ask your physician for additional care instructions.  In general:    ·  Incision:      · Keep clean and dry.    · Do NOT lift anything heavier than your baby for up to 6 weeks.    · There should not be any opening or pus.      VAGINAL CARE  · Nothing inside vagina for 6 weeks: no sexual intercourse, tampons or douching.  · Bleeding may continue for 2-4 weeks.  Amount may vary.    · Call your physician for heavy bleeding which means soaking more than 1 pad per hour    BIRTH CONTROL  · It is  "possible to become pregnant at any time after delivery and while breastfeeding.  · Plan to discuss a method of birth control with your physician at your follow up visit. visit.    DIET AND ELIMINATION  · Eating more fiber (bran cereal, fruits, and vegetables) and drinking plenty of fluids will help to avoid constipation.  · Urinary frequency after childbirth is normal.    POSTPARTUM BLUES  During the first few days after birth, you may experience a sense of the \"blues\" which may include impatience, irritability or even crying.  These feeling come and go quickly.  However, as many as 1 in 10 women experience emotional symptoms known as postpartum depression.    Postpartum depression:  May start as early as the second or third day after delivery or take several weeks or months to develop.  Symptoms of \"blues\" are present, but are more intense:  Crying spells; loss of appetite; feelings of hopelessness or loss of control; fear of touching the baby; over concern or no concern at all about the baby; little or no concern about your own appearance/caring for yourself; and/or inability to sleep or excessive sleeping.  Contact your physician if you are experiencing any of these symptoms.    Crisis Hotline:  · [place] Crisis Hotline:  1-945-DNOLLXK  Or 1-849.143.2840  · [place] Crisis Hotline:  1-326.375.8096  Or 467-226-8221    PREVENTING SHAKEN BABY:  If you are angry or stressed, PUT THE BABY IN THE CRIB, step away, take some deep breaths, and wait until you are calm to care for the baby.  DO NOT SHAKE THE BABY.  You are not alone, call a supporter for help.    · Crisis Call Center 24/7 crisis line 741-986-6260 or 1-256.592.1823  · You can also text them, text \"ANSWER\" to 082984    QUIT SMOKING/TOBACCO USE:  I understand the use of any tobacco products increases my chance of suffering from future heart disease and could cause other illnesses which may shorten my life. Quitting the use of tobacco products is the single most " important thing I can do to improve my health. For further information on smoking / tobacco cessation call a Toll Free Quit Line at 1-414.500.4630 (*[employer] [place]) or 1-462.531.4886 (American Lung Association) or you can access the web based program at www.lungusa.org.    · [place] Tobacco Users Help Line:  (196) 669-9525       Toll Free: 1-905.489.1042  · Quit Tobacco Program [facility] (941)437-4700    DEPRESSION / SUICIDE RISK:  As you are discharged from this [facility], it is important to learn how to keep safe from harming yourself.    Recognize the warning signs:  · Abrupt changes in personality, positive or negative- including increase in energy   · Giving away possessions  · Change in eating patterns- significant weight changes-  positive or negative  · Change in sleeping patterns- unable to sleep or sleeping all the time   · Unwillingness or inability to communicate  · Depression  · Unusual sadness, discouragement and loneliness  · Talk of wanting to die  · Neglect of personal appearance   · Rebelliousness- reckless behavior  · Withdrawal from people/activities they love  · Confusion- inability to concentrate     If you or a loved one observes any of these behaviors or has concerns about self-harm, here's what you can do:  · Talk about it- your feelings and reasons for harming yourself  · Remove any means that you might use to hurt yourself (examples: pills, rope, extension cords, firearm)  · Get professional help from the community (Mental Health, Substance Abuse, psychological counseling)  · Do not be alone:Call your Safe Contact- someone whom you trust who will be there for you.  · Call your local CRISIS HOTLINE 963-9802 or 665-296-6438  · Call your local Children's Mobile Crisis Response Team [place] (909) 998-9680 or www.[employer]  · Call the toll free National Suicide Prevention Hotlines   · National Suicide Prevention Lifeline 723-555-QBZC  (9717)  · [facility] 800-SUICIDE (207-7002)    DISCHARGE SURVEY:  Thank you for choosing [facility].  We hope we provided you with very good care.  You may be receiving a survey in the mail.  Please fill it out.  Your opinion is valuable to us.    ADDITIONAL EDUCATIONAL MATERIALS GIVEN TO PATIENT:        My signature on this form indicates that:  1.  I have reviewed and understand the above information  2.  My questions regarding this information have been answered to my satisfaction.  3.  I have formulated a plan with my discharge nurse to obtain my prescribed medication for home.

## 2020-12-29 NOTE — ANESTHESIA TIME REPORT
Anesthesia Start and Stop Event Times     Date Time Event    12/28/2020 1327 Ready for Procedure     1327 Anesthesia Start     1806 Anesthesia Stop        Responsible Staff  12/28/20    Name Role Begin End    Dorie Gonzalez M.D. Anesth 1327 1806        Preop Diagnosis (Free Text):  Pre-op Diagnosis             Preop Diagnosis (Codes):    Post op Diagnosis  Distress from pain in labor      Premium Reason  A. 3PM - 7AM    Comments:

## 2020-12-29 NOTE — ANESTHESIA POSTPROCEDURE EVALUATION
Patient: Tita Ribera    Procedure Summary     Date: 12/28/20 Room / Location:     Anesthesia Start: 1327 Anesthesia Stop: 1806    Procedure: Labor Epidural Diagnosis:     Scheduled Providers:  Responsible Provider: Dorie Gonzalez M.D.    Anesthesia Type: epidural ASA Status: 2          Final Anesthesia Type: epidural  Last vitals  BP   Blood Pressure: 110/70    Temp   37.2 °C (98.9 °F)    Pulse   Pulse: 72   Resp   18    SpO2   99 %      Anesthesia Post Evaluation    Patient location during evaluation: bedside  Patient participation: complete - patient participated  Level of consciousness: awake  Pain score: 0    Airway patency: patent  Anesthetic complications: no  Cardiovascular status: adequate  Respiratory status: acceptable  Hydration status: acceptable    PONV: none           Nurse Pain Score: 0 (NPRS)

## 2020-12-29 NOTE — CARE PLAN
Problem: Altered physiologic condition related to immediate post-delivery state and potential for bleeding/hemorrhage  Goal: Patient physiologically stable as evidenced by normal lochia, palpable uterine involution and vital signs within normal limits  Outcome: PROGRESSING AS EXPECTED  VSS, fundus firm, light lochia, will continue to monitor.      Problem: Pain Management  Goal: Pain level will decrease to patient's comfort goal  Outcome: PROGRESSING AS EXPECTED   Reports mild to moderate pain, reports relief with PRN medications and rest.

## 2020-12-29 NOTE — PROGRESS NOTES
Assumed care of patient, report from BERNADETTE Miller.  Patient assessment complete.  POC discussed, all questions answered at this time.

## 2020-12-29 NOTE — LACTATION NOTE
Mom holding baby after a 15 minute breastfeeding session. Mom states that her daughter suckled well and denied discomfort.    This is mom's third baby. Her first baby was formula fed, her now 3 year old son was breast and bottle fed for four months. Her son was over 9 pounds at birth and mom felt she did not produce enough milk for him.    This baby has breast fed twice and had one 5 ml formula bottle. Discussed establishment of initial milk supply.  Encouraged mom to exclusively breast feed this baby for first couple of weeks. Reviewed evaluating intake by watching stool and urine output.    Offered to assist mom with next feeding to help her evaluate milk transfer.  Mom has no documented risk factors regarding milk production.

## 2020-12-29 NOTE — PROGRESS NOTES
8873 Assessment completed, fundus firm, lochia light, POC reviewed, verbalized understanding. Denies pain at this time, will call if pain med intervention needed.

## 2020-12-29 NOTE — DISCHARGE SUMMARY
Discharge Summary:     Date of Admission: 2020  Date of Discharge: 20      Admitting diagnosis:    1. Pregnancy @ 38w2d      Discharge Diagnosis:   1. Status post vaginal, spontaneous.  2. Successful       Pregnancy Complications: none  Tubal Ligation:  no    Past Medical History:   Diagnosis Date   • Heart burn     with pregnancy   • Migraines 2020   • Pregnant 2020   • Urinary incontinence      OB History    Para Term  AB Living   3 3 3     3   SAB TAB Ectopic Molar Multiple Live Births           0 3      # Outcome Date GA Lbr Jose/2nd Weight Sex Delivery Anes PTL Lv   3 Term 20 38w2d  2.8 kg (6 lb 2.8 oz) F Vag-Spont EPI N KECIA   2 Term 18 39w0d  3.714 kg (8 lb 3 oz) M CS-Unspec   KECIA      Birth Comments: Pt states having issues with her placenta, baby was also having respiration problems   1 Term 11/09/15 39w0d  3.204 kg (7 lb 1 oz) F Vag-Spont EPI  KECIA     Past Surgical History:   Procedure Laterality Date   • PB ERCP,DIAGNOSTIC N/A 2020    Procedure: ERCP (ENDOSCOPIC RETROGRADE CHOLANGIOPANCREATOGRAPHY);  Surgeon: Ahsan Mitchell M.D.;  Location: SURGERY [facility];  Service: Gastroenterology   • PRIMARY C SECTION       Patient has no known allergies.    Patient Active Problem List   Diagnosis   • Migraines   • Supervision of other high risk pregnancy, antepartum   • History of  delivery- desires repeat, unsure about tubal   • ASCUS of cervix with negative high risk HPV   • Acid indigestion       Hospital Course:   25 y.o. , now para 3, was admitted with the above mentioned diagnosis, underwent Active Labor, vaginal, spontaneous. Pt gave birth to baby girl with APGARs of 8/9 and weight 2800g.  Patient's postpartum course was unremarkable, with progressive advancement in diet , ambulation and toleration of oral analgesia. Patient without complaints today and desires discharge.  For postpartum contraception, pt desires to discuss at follow up  appointment.    Physical Exam:  Temp:  [36.1 °C (96.9 °F)-37.2 °C (98.9 °F)] 36.2 °C (97.1 °F)  Pulse:  [] 64  Resp:  [17-18] 17  BP: ()/(51-75) 100/63  SpO2:  [95 %-100 %] 98 %  Physical Exam  General: well  Chest/Breasts: nipples intact   Abdomen: minimal tenderness, soft, non-distended  Fundus: firm and below umbilicus  Incision: not applicable, (vaginal delivery)  Perineum: deferred  Extremities: symmetric and no edema, calves nontender    Current Facility-Administered Medications   Medication Dose   • ondansetron (ZOFRAN ODT) dispertab 4 mg  4 mg    Or   • ondansetron (ZOFRAN) syringe/vial injection 4 mg  4 mg   • metoclopramide (REGLAN) injection 10 mg  10 mg   • oxytocin (PITOCIN) infusion (for postpartum)   mL/hr   • ibuprofen (MOTRIN) tablet 800 mg  800 mg   • acetaminophen (Tylenol) tablet 325 mg  325 mg   • oxyCODONE immediate-release (ROXICODONE) tablet 5 mg  5 mg   • oxyCODONE immediate release (ROXICODONE) tablet 10 mg  10 mg   • ropivacaine 0.2 % (NAROPIN) injection     • oxytocin (PITOCIN) 20 UNITS/1000ML LR (induction of labor)  0.5-20 nehemias-units/min   • LR infusion     • PRN oxytocin (PITOCIN) (20 Units/1000 mL) PRN for excessive uterine bleeding - See Admin Instr  125-999 mL/hr   • docusate sodium (COLACE) capsule 100 mg  100 mg   • bisacodyl (DULCOLAX) suppository 10 mg  10 mg   • magnesium hydroxide (MILK OF MAGNESIA) suspension 30 mL  30 mL   • prenatal plus vitamin (STUARTNATAL 1+1) 27-1 MG tablet 1 Tab  1 Tab       Recent Labs     12/28/20  1059 12/29/20  0432   WBC 8.7 9.7   RBC 4.49 4.10*   HEMOGLOBIN 13.0 11.8*   HEMATOCRIT 39.6 36.5*   MCV 88.2 89.0   MCH 29.0 28.8   MCHC 32.8* 32.3*   RDW 40.7 40.9   PLATELETCT 194 175   MPV 12.8 12.8       Activity:   Discharge to home  Pelvic Rest x 6 weeks  Call or come to ED for: heavy vaginal bleeding, fever >100.4, severe abdominal pain, severe headache, chest pain, shortness of breath,  N/V, incisional drainage, or other  concerns.      Assessment:  normal postpartum course     [facility] in 5 weeks for vaginal delivery; 1 week for incision check for  delivery.      Discharge Instructions:  Pelvic rest x 6 weeks  No heavy lifting until cleared by physician  Return to ED or come to the office for severe headache, shortness of breath, chest pain, heavy vaginal bleeding, incisional drainage, foul smelling vaginal discharge, or fever >100.4     Discharge Meds:   Current Outpatient Medications   Medication Sig Dispense Refill   • docusate sodium 100 MG Cap Take 100 mg by mouth 2 times a day as needed for Constipation for up to 60 days. 120 Cap 0   • ibuprofen (MOTRIN) 800 MG Tab Take 1 Tab by mouth every 8 hours as needed (For cramping after delivery; do not give if patient is receiving ketorolac (Toradol)) for up to 30 days. 30 Tab 0

## 2022-05-31 ENCOUNTER — HOSPITAL ENCOUNTER (OUTPATIENT)
Facility: MEDICAL CENTER | Age: 27
End: 2022-05-31
Attending: OBSTETRICS & GYNECOLOGY
Payer: COMMERCIAL

## 2022-05-31 PROCEDURE — 87624 HPV HI-RISK TYP POOLED RSLT: CPT

## 2022-05-31 PROCEDURE — 87491 CHLMYD TRACH DNA AMP PROBE: CPT

## 2022-05-31 PROCEDURE — 87661 TRICHOMONAS VAGINALIS AMPLIF: CPT

## 2022-05-31 PROCEDURE — 88175 CYTOPATH C/V AUTO FLUID REDO: CPT

## 2022-05-31 PROCEDURE — 87591 N.GONORRHOEAE DNA AMP PROB: CPT

## 2022-06-01 LAB
CYTOLOGY REG CYTOL: NORMAL
HPV HR 12 DNA CVX QL NAA+PROBE: NEGATIVE
HPV16 DNA SPEC QL NAA+PROBE: NEGATIVE
HPV18 DNA SPEC QL NAA+PROBE: NEGATIVE
SPECIMEN SOURCE: NORMAL

## 2022-06-22 LAB
SPEC CONTAINER SPEC: NORMAL
SPECIMEN SOURCE: NORMAL
T VAGINALIS RRNA SPEC QL NAA+PROBE: NEGATIVE

## 2023-03-31 NOTE — ANESTHESIA PROCEDURE NOTES
Airway    Date/Time: 12/1/2020 2:02 PM  Performed by: Kathia Oropeza M.D.  Authorized by: Kathia Oropeza M.D.     Location:  OR  Urgency:  Elective  Indications for Airway Management:  Anesthesia      Spontaneous Ventilation: absent    Sedation Level:  Deep  Preoxygenated: Yes    Patient Position:  Reverse Trendelenburg  Mask Difficulty Assessment:  0 - not attempted  Final Airway Type:  Endotracheal airway  Final Endotracheal Airway:  ETT  Cuffed: Yes    Technique Used for Successful ETT Placement:  Direct laryngoscopy  Devices/Methods Used in Placement:  Intubating stylet    Insertion Site:  Oral  Blade Type:  Maegan  Laryngoscope Blade/Videolaryngoscope Blade Size:  3  ETT Size (mm):  6.5  Leak Pressue (cm H2O):  35  Measured from:  Teeth  ETT to Teeth (cm):  21  Placement Verified by: auscultation and capnometry    Cormack-Lehane Classification:  Grade I - full view of glottis  Number of Attempts at Approach:  1          
Detail Level: Detailed
Add 28665 Cpt? (Important Note: In 2017 The Use Of 44246 Is Being Tracked By Cms To Determine Future Global Period Reimbursement For Global Periods): no
Wound Evaluated By: Mackenzie TSE

## 2023-09-18 NOTE — TELEPHONE ENCOUNTER
9/18/23 Received callback from daughterIsa reporting patient is back home in [place], [place] and doing fine. PCP established in [place]. Case closed ineligible.    Received request via: Pharmacy    Was the patient seen in the last year in this department? Yes 11/13/20 (prenatal visit)    Does the patient have an active prescription (recently filled or refills available) for medication(s) requested? No   Pharmacy states that patient is out of refills.    Routed to Deepthi Gomez for review

## 2025-06-05 ENCOUNTER — HOSPITAL ENCOUNTER (OUTPATIENT)
Dept: LAB | Facility: MEDICAL CENTER | Age: 30
End: 2025-06-05
Attending: PHYSICIAN ASSISTANT
Payer: COMMERCIAL

## 2025-06-05 LAB
ALBUMIN SERPL BCP-MCNC: 4.2 G/DL (ref 3.2–4.9)
ALBUMIN/GLOB SERPL: 1.2 G/DL
ALP SERPL-CCNC: 68 U/L (ref 30–99)
ALT SERPL-CCNC: 13 U/L (ref 2–50)
ANION GAP SERPL CALC-SCNC: 11 MMOL/L (ref 7–16)
AST SERPL-CCNC: 21 U/L (ref 12–45)
BILIRUB SERPL-MCNC: <0.2 MG/DL (ref 0.1–1.5)
BUN SERPL-MCNC: 12 MG/DL (ref 8–22)
CALCIUM ALBUM COR SERPL-MCNC: 9.3 MG/DL (ref 8.5–10.5)
CALCIUM SERPL-MCNC: 9.5 MG/DL (ref 8.5–10.5)
CHLORIDE SERPL-SCNC: 107 MMOL/L (ref 96–112)
CHOLEST SERPL-MCNC: 146 MG/DL (ref 100–199)
CO2 SERPL-SCNC: 22 MMOL/L (ref 20–33)
CREAT SERPL-MCNC: 0.82 MG/DL (ref 0.5–1.4)
ERYTHROCYTE [DISTWIDTH] IN BLOOD BY AUTOMATED COUNT: 41.9 FL (ref 35.9–50)
EST. AVERAGE GLUCOSE BLD GHB EST-MCNC: 108 MG/DL
GFR SERPLBLD CREATININE-BSD FMLA CKD-EPI: 99 ML/MIN/1.73 M 2
GLOBULIN SER CALC-MCNC: 3.5 G/DL (ref 1.9–3.5)
GLUCOSE SERPL-MCNC: 90 MG/DL (ref 65–99)
HBA1C MFR BLD: 5.4 % (ref 4–5.6)
HCT VFR BLD AUTO: 42.9 % (ref 37–47)
HDLC SERPL-MCNC: 44 MG/DL
HGB BLD-MCNC: 13.7 G/DL (ref 12–16)
LDLC SERPL CALC-MCNC: 77 MG/DL
MCH RBC QN AUTO: 29.5 PG (ref 27–33)
MCHC RBC AUTO-ENTMCNC: 31.9 G/DL (ref 32.2–35.5)
MCV RBC AUTO: 92.3 FL (ref 81.4–97.8)
PLATELET # BLD AUTO: 300 K/UL (ref 164–446)
PMV BLD AUTO: 11.5 FL (ref 9–12.9)
POTASSIUM SERPL-SCNC: 4.5 MMOL/L (ref 3.6–5.5)
PROT SERPL-MCNC: 7.7 G/DL (ref 6–8.2)
RBC # BLD AUTO: 4.65 M/UL (ref 4.2–5.4)
SODIUM SERPL-SCNC: 140 MMOL/L (ref 135–145)
TRIGL SERPL-MCNC: 125 MG/DL (ref 0–149)
TSH SERPL DL<=0.005 MIU/L-ACNC: 2.18 UIU/ML (ref 0.38–5.33)
WBC # BLD AUTO: 8.4 K/UL (ref 4.8–10.8)

## 2025-06-05 PROCEDURE — 84443 ASSAY THYROID STIM HORMONE: CPT

## 2025-06-05 PROCEDURE — 85027 COMPLETE CBC AUTOMATED: CPT

## 2025-06-05 PROCEDURE — 83036 HEMOGLOBIN GLYCOSYLATED A1C: CPT

## 2025-06-05 PROCEDURE — 80053 COMPREHEN METABOLIC PANEL: CPT

## 2025-06-05 PROCEDURE — 80061 LIPID PANEL: CPT

## 2025-06-05 PROCEDURE — 36415 COLL VENOUS BLD VENIPUNCTURE: CPT

## 2025-08-12 ENCOUNTER — HOSPITAL ENCOUNTER (OUTPATIENT)
Facility: MEDICAL CENTER | Age: 30
End: 2025-08-12
Attending: STUDENT IN AN ORGANIZED HEALTH CARE EDUCATION/TRAINING PROGRAM
Payer: COMMERCIAL

## 2025-08-12 ENCOUNTER — OFFICE VISIT (OUTPATIENT)
Dept: URGENT CARE | Facility: PHYSICIAN GROUP | Age: 30
End: 2025-08-12
Payer: COMMERCIAL

## 2025-08-12 VITALS
RESPIRATION RATE: 18 BRPM | HEART RATE: 76 BPM | WEIGHT: 201.6 LBS | OXYGEN SATURATION: 97 % | HEIGHT: 62 IN | TEMPERATURE: 97.6 F | DIASTOLIC BLOOD PRESSURE: 68 MMHG | SYSTOLIC BLOOD PRESSURE: 94 MMHG | BODY MASS INDEX: 37.1 KG/M2

## 2025-08-12 DIAGNOSIS — R42 LIGHTHEADED: Primary | ICD-10-CM

## 2025-08-12 DIAGNOSIS — N30.01 ACUTE CYSTITIS WITH HEMATURIA: ICD-10-CM

## 2025-08-12 DIAGNOSIS — R11.0 NAUSEA: ICD-10-CM

## 2025-08-12 LAB
APPEARANCE UR: CLEAR
BILIRUB UR STRIP-MCNC: NORMAL MG/DL
COLOR UR AUTO: NORMAL
FLUAV RNA SPEC QL NAA+PROBE: NEGATIVE
FLUBV RNA SPEC QL NAA+PROBE: NEGATIVE
GLUCOSE UR STRIP.AUTO-MCNC: NORMAL MG/DL
KETONES UR STRIP.AUTO-MCNC: NORMAL MG/DL
LEUKOCYTE ESTERASE UR QL STRIP.AUTO: NORMAL
NITRITE UR QL STRIP.AUTO: NORMAL
PH UR STRIP.AUTO: 6 [PH] (ref 5–8)
POCT INT CON NEG: NEGATIVE
POCT INT CON POS: POSITIVE
POCT URINE PREGNANCY TEST: NEGATIVE
PROT UR QL STRIP: NORMAL MG/DL
RBC UR QL AUTO: NORMAL
RSV RNA SPEC QL NAA+PROBE: NEGATIVE
SARS-COV-2 RNA RESP QL NAA+PROBE: NEGATIVE
SP GR UR STRIP.AUTO: <=1.005
UROBILINOGEN UR STRIP-MCNC: 0.2 MG/DL

## 2025-08-12 PROCEDURE — 99204 OFFICE O/P NEW MOD 45 MIN: CPT | Performed by: STUDENT IN AN ORGANIZED HEALTH CARE EDUCATION/TRAINING PROGRAM

## 2025-08-12 PROCEDURE — 87637 SARSCOV2&INF A&B&RSV AMP PRB: CPT | Performed by: STUDENT IN AN ORGANIZED HEALTH CARE EDUCATION/TRAINING PROGRAM

## 2025-08-12 PROCEDURE — 3078F DIAST BP <80 MM HG: CPT | Performed by: STUDENT IN AN ORGANIZED HEALTH CARE EDUCATION/TRAINING PROGRAM

## 2025-08-12 PROCEDURE — 3074F SYST BP LT 130 MM HG: CPT | Performed by: STUDENT IN AN ORGANIZED HEALTH CARE EDUCATION/TRAINING PROGRAM

## 2025-08-12 PROCEDURE — 87086 URINE CULTURE/COLONY COUNT: CPT

## 2025-08-12 PROCEDURE — 81025 URINE PREGNANCY TEST: CPT | Performed by: STUDENT IN AN ORGANIZED HEALTH CARE EDUCATION/TRAINING PROGRAM

## 2025-08-12 PROCEDURE — 81002 URINALYSIS NONAUTO W/O SCOPE: CPT | Performed by: STUDENT IN AN ORGANIZED HEALTH CARE EDUCATION/TRAINING PROGRAM

## 2025-08-12 RX ORDER — CEFDINIR 300 MG/1
300 CAPSULE ORAL EVERY 12 HOURS
Qty: 10 CAPSULE | Refills: 0 | Status: SHIPPED | OUTPATIENT
Start: 2025-08-12 | End: 2025-08-17

## 2025-08-12 ASSESSMENT — FIBROSIS 4 INDEX: FIB4 SCORE: 0.56

## 2025-08-14 LAB
BACTERIA UR CULT: NORMAL
SIGNIFICANT IND 70042: NORMAL
SITE SITE: NORMAL
SOURCE SOURCE: NORMAL

## 2025-08-27 ENCOUNTER — OFFICE VISIT (OUTPATIENT)
Dept: URGENT CARE | Facility: PHYSICIAN GROUP | Age: 30
End: 2025-08-27
Payer: COMMERCIAL

## 2025-08-27 VITALS
DIASTOLIC BLOOD PRESSURE: 72 MMHG | RESPIRATION RATE: 14 BRPM | HEIGHT: 62 IN | OXYGEN SATURATION: 99 % | WEIGHT: 201 LBS | BODY MASS INDEX: 36.99 KG/M2 | TEMPERATURE: 98.4 F | HEART RATE: 101 BPM | SYSTOLIC BLOOD PRESSURE: 108 MMHG

## 2025-08-27 DIAGNOSIS — L03.90 WOUND CELLULITIS: Primary | ICD-10-CM

## 2025-08-27 PROCEDURE — 99213 OFFICE O/P EST LOW 20 MIN: CPT | Performed by: PHYSICIAN ASSISTANT

## 2025-08-27 PROCEDURE — 3078F DIAST BP <80 MM HG: CPT | Performed by: PHYSICIAN ASSISTANT

## 2025-08-27 PROCEDURE — 3074F SYST BP LT 130 MM HG: CPT | Performed by: PHYSICIAN ASSISTANT

## 2025-08-27 RX ORDER — SULFAMETHOXAZOLE AND TRIMETHOPRIM 800; 160 MG/1; MG/1
1 TABLET ORAL 2 TIMES DAILY
Qty: 14 TABLET | Refills: 0 | Status: SHIPPED | OUTPATIENT
Start: 2025-08-27 | End: 2025-09-03

## 2025-08-27 ASSESSMENT — FIBROSIS 4 INDEX: FIB4 SCORE: 0.56

## (undated) DEVICE — CHLORAPREP 26 ML APPLICATOR - ORANGE TINT(25/CA)

## (undated) DEVICE — SLEEVE, SEQUENTIAL CALF REG

## (undated) DEVICE — PACK C-SECTION (2EA/CA)

## (undated) DEVICE — DRESSING INTERCEED ABSORBABLE ADHESION BARRIER TC7 (10EA/CA)

## (undated) DEVICE — DETERGENT RENUZYME PLUS 10 OZ PACKET (50/BX)

## (undated) DEVICE — KIT CUSTOM PROCEDURE SINGLE FOR ENDO  (15/CA)

## (undated) DEVICE — KIT ANESTHESIA W/CIRCUIT & 3/LT BAG W/FILTER (20EA/CA)

## (undated) DEVICE — SUTURE 1 CHROMIC CTX ETHICON - (36PK/BX)

## (undated) DEVICE — PACK ROOM TURNOVER L&D (12/CA)

## (undated) DEVICE — TUBING CLEARLINK DUO-VENT - C-FLO (48EA/CA)

## (undated) DEVICE — NEPTUNE 4 PORT MANIFOLD - (20/PK)

## (undated) DEVICE — LACTATED RINGERS INJ 1000 ML - (14EA/CA 60CA/PF)

## (undated) DEVICE — SET EXTENSION WITH 2 PORTS (48EA/CA) ***PART #2C8610 IS A SUBSTITUTE*****

## (undated) DEVICE — STAPLER SKIN DISP - (6/BX 10BX/CA) VISISTAT

## (undated) DEVICE — SPONGE GAUZESTER 4 X 4 4PLY - (128PK/CA)

## (undated) DEVICE — PROTECTOR ULNA NERVE - (36PR/CA)

## (undated) DEVICE — CONTAINER, SPECIMEN, STERILE

## (undated) DEVICE — TELFA 8 X 10 BIOSEAL - (50EA/CA)

## (undated) DEVICE — SUTURE 0 VICRYL PLUS CT 36 (36PK/BX)"

## (undated) DEVICE — CANNULA W/ SUPPLY TUBING O2 - (50/CA)

## (undated) DEVICE — ELECTRODE 850 FOAM ADHESIVE - HYDROGEL RADIOTRNSPRNT (50/PK)

## (undated) DEVICE — KIT  I.V. START (100EA/CA)

## (undated) DEVICE — SUTURE 3-0 VICRYL PLUS CT-1 - 36 INCH (36/BX)

## (undated) DEVICE — TAPE CLOTH MEDIPORE 6 INCH - (12RL/CA)

## (undated) DEVICE — WATER IRRIGATION STERILE 1000ML (12EA/CA)

## (undated) DEVICE — GLOVE BIOGEL SZ 8 SURGICAL PF LTX - (50PR/BX 4BX/CA)

## (undated) DEVICE — PENCIL ELECTSURG 10FT HLSTR - WITH BLADE (50EA/CA)

## (undated) DEVICE — CANISTER SUCTION 3000ML MECHANICAL FILTER AUTO SHUTOFF MEDI-VAC NONSTERILE LF DISP  (40EA/CA)

## (undated) DEVICE — PAD GROUNDING PRE-JELLED - (50EA/PK)

## (undated) DEVICE — SYRINGE 30 ML LS (56/BX)

## (undated) DEVICE — SODIUM CHL IRRIGATION 0.9% 1000ML (12EA/CA)

## (undated) DEVICE — BITE BLOCK ADULT 60FR (100EA/CA)

## (undated) DEVICE — SENSOR SPO2 NEO LNCS ADHESIVE (20/BX) SEE USER NOTES

## (undated) DEVICE — BALLOON RETRIEVAL EXTRACTOR PRO RX   9-12MM

## (undated) DEVICE — HEAD HOLDER JUNIOR/ADULT

## (undated) DEVICE — BLANKET UNDERBODY FULL ACCES - (5/CA)

## (undated) DEVICE — FILM CASSETTE ENDO

## (undated) DEVICE — Device

## (undated) DEVICE — CATHETER IV NON-SAFETY 18 GA X 1 1/4 (50/BX 4BX/CA)

## (undated) DEVICE — MASK ANESTHESIA ADULT  - (100/CA)

## (undated) DEVICE — SYRINGE DISP. 12 CC LL - (100/BX)

## (undated) DEVICE — SUTURE 2-0 CHROMIC GUT CT-1 27 (36PK/BX)"

## (undated) DEVICE — TRAY SPINAL ANESTHESIA NON-SAFETY (10/CA)

## (undated) DEVICE — SUTURE 0 VICRYL PLUS CT-1 - 36 INCH (36/BX)

## (undated) DEVICE — SUCTION INSTRUMENT YANKAUER BULBOUS TIP W/O VENT (50EA/CA)